# Patient Record
Sex: FEMALE | Race: WHITE | Employment: OTHER | URBAN - METROPOLITAN AREA
[De-identification: names, ages, dates, MRNs, and addresses within clinical notes are randomized per-mention and may not be internally consistent; named-entity substitution may affect disease eponyms.]

---

## 2019-01-21 ENCOUNTER — HOSPITAL ENCOUNTER (OUTPATIENT)
Dept: WOMENS IMAGING | Age: 69
Discharge: HOME | End: 2019-01-21
Payer: MEDICARE

## 2019-01-21 DIAGNOSIS — Z12.31 VISIT FOR SCREENING MAMMOGRAM: ICD-10-CM

## 2019-01-21 PROCEDURE — 77063 BREAST TOMOSYNTHESIS BI: CPT

## 2019-01-22 ENCOUNTER — CASE MANAGEMENT (OUTPATIENT)
Dept: WOMENS IMAGING | Age: 69
End: 2019-01-22

## 2019-01-28 ENCOUNTER — CASE MANAGEMENT (OUTPATIENT)
Dept: WOMENS IMAGING | Age: 69
End: 2019-01-28

## 2019-02-05 ENCOUNTER — HOSPITAL ENCOUNTER (OUTPATIENT)
Dept: PULMONOLOGY | Age: 69
Discharge: HOME | End: 2019-02-05
Payer: MEDICARE

## 2019-02-05 PROCEDURE — 94726 PLETHYSMOGRAPHY LUNG VOLUMES: CPT

## 2019-02-05 PROCEDURE — 94060 EVALUATION OF WHEEZING: CPT | Performed by: INTERNAL MEDICINE

## 2019-02-05 PROCEDURE — 94729 DIFFUSING CAPACITY: CPT | Performed by: INTERNAL MEDICINE

## 2019-02-05 PROCEDURE — 94729 DIFFUSING CAPACITY: CPT

## 2019-02-05 PROCEDURE — 6370000000 HC RX 637 (ALT 250 FOR IP): Performed by: NURSE PRACTITIONER

## 2019-02-05 PROCEDURE — 94640 AIRWAY INHALATION TREATMENT: CPT

## 2019-02-05 PROCEDURE — 94726 PLETHYSMOGRAPHY LUNG VOLUMES: CPT | Performed by: INTERNAL MEDICINE

## 2019-02-05 PROCEDURE — 94664 DEMO&/EVAL PT USE INHALER: CPT

## 2019-02-05 PROCEDURE — 94060 EVALUATION OF WHEEZING: CPT

## 2019-02-05 RX ORDER — ALBUTEROL SULFATE 90 UG/1
4 AEROSOL, METERED RESPIRATORY (INHALATION) ONCE
Status: COMPLETED | OUTPATIENT
Start: 2019-02-05 | End: 2019-02-05

## 2019-02-05 RX ADMIN — ALBUTEROL SULFATE 4 PUFF: 90 AEROSOL, METERED RESPIRATORY (INHALATION) at 08:56

## 2019-04-02 ENCOUNTER — HOSPITAL ENCOUNTER (OUTPATIENT)
Dept: WOMENS IMAGING | Age: 69
Discharge: HOME | End: 2019-04-02
Payer: MEDICARE

## 2019-04-02 ENCOUNTER — OFFICE VISIT (OUTPATIENT)
Dept: PULMONOLOGY | Age: 69
End: 2019-04-02
Payer: MEDICARE

## 2019-04-02 ENCOUNTER — HOSPITAL ENCOUNTER (OUTPATIENT)
Dept: ULTRASOUND IMAGING | Age: 69
Discharge: HOME | End: 2019-04-02
Payer: MEDICARE

## 2019-04-02 VITALS
HEIGHT: 62 IN | SYSTOLIC BLOOD PRESSURE: 132 MMHG | HEART RATE: 71 BPM | BODY MASS INDEX: 37.98 KG/M2 | OXYGEN SATURATION: 96 % | TEMPERATURE: 98.7 F | RESPIRATION RATE: 16 BRPM | WEIGHT: 206.4 LBS | DIASTOLIC BLOOD PRESSURE: 80 MMHG

## 2019-04-02 DIAGNOSIS — R92.8 ABNORMAL MAMMOGRAM: ICD-10-CM

## 2019-04-02 DIAGNOSIS — G47.30 SLEEP APNEA, UNSPECIFIED TYPE: ICD-10-CM

## 2019-04-02 DIAGNOSIS — J45.909 ASTHMA, UNSPECIFIED ASTHMA SEVERITY, UNSPECIFIED WHETHER COMPLICATED, UNSPECIFIED WHETHER PERSISTENT: ICD-10-CM

## 2019-04-02 DIAGNOSIS — J45.909 ASTHMA, UNSPECIFIED ASTHMA SEVERITY, UNSPECIFIED WHETHER COMPLICATED, UNSPECIFIED WHETHER PERSISTENT: Primary | ICD-10-CM

## 2019-04-02 LAB
BASOPHILS ABSOLUTE: 0.1 K/UL (ref 0–0.2)
BASOPHILS RELATIVE PERCENT: 0.6 %
EOSINOPHILS ABSOLUTE: 0.3 K/UL (ref 0–0.6)
EOSINOPHILS RELATIVE PERCENT: 2.9 %
HCT VFR BLD CALC: 38.2 % (ref 36–48)
HEMOGLOBIN: 12.2 G/DL (ref 12–16)
LYMPHOCYTES ABSOLUTE: 2.7 K/UL (ref 1–5.1)
LYMPHOCYTES RELATIVE PERCENT: 29.4 %
MCH RBC QN AUTO: 23.6 PG (ref 26–34)
MCHC RBC AUTO-ENTMCNC: 31.9 G/DL (ref 31–36)
MCV RBC AUTO: 74 FL (ref 80–100)
MONOCYTES ABSOLUTE: 1 K/UL (ref 0–1.3)
MONOCYTES RELATIVE PERCENT: 10.6 %
NEUTROPHILS ABSOLUTE: 5.2 K/UL (ref 1.7–7.7)
NEUTROPHILS RELATIVE PERCENT: 56.5 %
PDW BLD-RTO: 16.8 % (ref 12.4–15.4)
PLATELET # BLD: 326 K/UL (ref 135–450)
PMV BLD AUTO: 9.6 FL (ref 5–10.5)
RBC # BLD: 5.16 M/UL (ref 4–5.2)
WBC # BLD: 9.1 K/UL (ref 4–11)

## 2019-04-02 PROCEDURE — 76642 ULTRASOUND BREAST LIMITED: CPT

## 2019-04-02 PROCEDURE — 99204 OFFICE O/P NEW MOD 45 MIN: CPT | Performed by: INTERNAL MEDICINE

## 2019-04-02 PROCEDURE — G0279 TOMOSYNTHESIS, MAMMO: HCPCS

## 2019-04-02 RX ORDER — LISINOPRIL 10 MG/1
TABLET ORAL
COMMUNITY
End: 2021-07-04

## 2019-04-02 RX ORDER — FLUTICASONE PROPIONATE 50 MCG
SPRAY, SUSPENSION (ML) NASAL
COMMUNITY
Start: 2019-03-12 | End: 2021-07-04

## 2019-04-02 RX ORDER — BUDESONIDE AND FORMOTEROL FUMARATE DIHYDRATE 160; 4.5 UG/1; UG/1
2 AEROSOL RESPIRATORY (INHALATION) 2 TIMES DAILY
Qty: 1 INHALER | Refills: 6 | Status: SHIPPED | OUTPATIENT
Start: 2019-04-02 | End: 2021-07-04

## 2019-04-02 NOTE — ASSESSMENT & PLAN NOTE
-She has symptoms of daytime sleepiness with multiple naps, loud snoring, obesity, witnessed apneic episodes. concerning for obstructive sleep apnea.   -Split-night sleep study with PAP titration  -Patient advised not to drive or operate heavy machinery if feeling overly sleepy

## 2019-04-02 NOTE — PROGRESS NOTES
REASON FOR CONSULTATION:  Chief Complaint   Patient presents with    New Patient     ref by Dr. Alberto Mae for SOB         Consult at request of Preethi Mary MD     PCP: Preethi Mary MD    HISTORY OF PRESENT ILLNESS: Collin Horner is a 79y.o. year old female never smoker who presents for evaluation of subjective dyspnea. First noticed her symptoms or shortness of breath, mild cough, intermittent wheezing when she laid down at night to go to sleep. Since then she has also identified other triggers of her shortness of breath. These include strong fragrances and perfumes. She was given albuterol inhaler to be used as needed, she thinks his dizziness has improved her wheezing and shortness of breath. She is using albuterol greater than 2 times daily. Recent PFTs performed were within normal limits. Past Medical History:   Diagnosis Date    Colon cancer ([address]     High blood pressure        Past Surgical History:   Procedure Laterality Date    BREAST BIOPSY      COLON SURGERY      TUBAL LIGATION         family history includes No Known Problems in an other family member. Granddaughter and daughter with asthma    SOCIAL HISTORY:   reports that she has quit smoking. She quit after 3.00 years of use. She has never used smokeless tobacco.      ALLERGIES:  Patient has No Known Allergies. REVIEW OF SYSTEMS:  Constitutional: Negative for fever   HENT: Negative for sore throat  Eyes: Negative for redness   Respiratory:+ dyspnea, +cough  Cardiovascular: Negative for chest pain  Gastrointestinal: Negative for vomiting, diarrhea   Genitourinary: Negative for hematuria   Musculoskeletal: Negative for arthralgias   Skin: Negative for rash  Neurological: Negative for syncope  Hematological: Negative for adenopathy  Psychiatric/Behavorial: Negative for anxiety    Objective:   PHYSICAL EXAM:  Blood pressure 132/80, pulse 71, temperature 98.7 °F (37.1 °C), temperature source Oral, resp.  rate 16, height 5' 2\" (1.575 m), weight 206 lb 6.4 oz (93.6 kg), SpO2 96 %.'  Gen: No distress. Eyes: PERRL. No sclera icterus. No conjunctival injection. ENT: No discharge. Pharynx clear. External appearance of ears and nose normal.  Neck: Trachea midline. No obvious mass. Resp: No accessory muscle use. No crackles. No wheezes. No rhonchi. no Dullness to percussion. Coughing with forced expiratory maneuver. CV: Regular rate. Regular rhythm. No murmur or rub. No edema. GI: Non-tender. Non-distended. No hernia. Skin: Warm, dry, normal texture and turgor. No nodule on exposed extremities. Lymph: No cervical LAD. No supraclavicular LAD. M/S: No cyanosis. No clubbing. No joint deformity. Neuro: Moves all four extremities. Psych: Oriented x 3. No anxiety. Awake. Alert. Intact judgement and insight. Current Outpatient Medications   Medication Sig Dispense Refill    budesonide-formoterol (SYMBICORT) 160-4.5 MCG/ACT AERO Inhale 2 puffs into the lungs 2 times daily 1 Inhaler 6    albuterol sulfate (PROAIR RESPICLICK) 131 (90 Base) MCG/ACT aerosol powder inhalation Ventolin HFA 90 mcg/actuation aerosol inhaler      MULTIPLE VITAMIN PO Take by mouth      fluticasone (FLONASE) 50 MCG/ACT nasal spray       lisinopril (PRINIVIL;ZESTRIL) 10 MG tablet lisinopril 10 mg tablet   TAKE ONE TABLET BY MOUTH DAILY       No current facility-administered medications for this visit.         Data Reviewed:   CBC and Renal reviewed  Last CBC  Lab Results   Component Value Date    WBC 6.8 09/01/2017    RBC 4.49 09/01/2017    HGB 10.8 09/01/2017    MCV 74.9 09/01/2017     09/01/2017     Last Renal  Lab Results   Component Value Date     09/01/2017    K 4.4 09/01/2017    CL 99 09/01/2017    CO2 26 09/01/2017    BUN 13 09/01/2017    CREATININE 0.6 09/01/2017    GLUCOSE 98 09/01/2017    CALCIUM 9.1 09/01/2017       Last ABG  POC Blood Gas: No results found for: POCPH, POCPCO2, POCPO2, POCHCO3, NBEA, [id number]  No results for input(s): PH, PCO2, PO2, HCO3, BE, O2SAT in the last 72 hours. Radiology Review:  Pertinent images / reports were reviewed as a part of this visit. CT Chest w/ contrast: No results found for this or any previous visit. CT Chest w/o contrast: No results found for this or any previous visit. CTPA: No results found for this or any previous visit. CXR PA/LAT: No results found for this or any previous visit. CXR portable: No results found for this or any previous visit. Assessment/Plan:       Diagnosis Orders   1. Asthma, unspecified asthma severity, unspecified whether complicated, unspecified whether persistent  Respiratory allergen profile    CBC Auto Differential   2. Sleep apnea, unspecified type  Sleep Study with PAP Titration         Problem List Items Addressed This Visit        Pulmonary Problems    Asthma - Primary     -Her symptoms of shortness of breath/cough at night, common triggers, improvement with rescue inhaler point toward clinical diagnosis of onset asthma. This is supported by strong family history of asthma. -PFTs within normal limits  -Symbicort twice daily, continue rescue inhaler as needed  -Respiratory allergen panel, CBC with differential         Relevant Medications    albuterol sulfate (PROAIR RESPICLICK) 381 (90 Base) MCG/ACT aerosol powder inhalation    budesonide-formoterol (SYMBICORT) 160-4.5 MCG/ACT AERO    Other Relevant Orders    Respiratory allergen profile    CBC Auto Differential    Sleep apnea     -She has symptoms of daytime sleepiness with multiple naps, loud snoring, obesity, witnessed apneic episodes. concerning for obstructive sleep apnea. -Split-night sleep study with PAP titration  -Patient advised not to drive or operate heavy machinery if feeling overly sleepy         Relevant Orders    Sleep Study with PAP Titration        Return to Clinic in 3 months     This note was transcribed using [id number] [employer].  Please disregard any translational errors.     Anthony Gomez [address] [place] [address] Pulmonary, Sleep and Critical Care

## 2019-04-05 LAB
ALLERGEN ASPERGILLUS ALTERNATA IGE: 1.93 KU/L
ALLERGEN ASPERGILLUS FUMIGATUS IGE: 0.16 KU/L
ALLERGEN BERMUDA GRASS IGE: <0.1 KU/L
ALLERGEN BIRCH IGE: <0.1 KU/L
ALLERGEN CAT DANDER IGE: 8.04 KU/L
ALLERGEN COMMON SHORT RAGWEED IGE: 0.42 KU/L
ALLERGEN COTTONWOOD: <0.1 KU/L
ALLERGEN COW MILK IGE: <0.1 KU/L
ALLERGEN DOG DANDER IGE: 0.69 KU/L
ALLERGEN ELM IGE: <0.1 KU/L
ALLERGEN FUNGI/MOLD M.RACEMOSUS IGE: <0.1 KU/L
ALLERGEN GERMAN COCKROACH IGE: 0.1 KU/L
ALLERGEN HORMODENDRUM HORDEI IGE: <0.1 KU/L
ALLERGEN MAPLE/BOX ELDER IGE: 0.14 KU/L
ALLERGEN MITE DUST FARINAE IGE: 0.1 KU/L
ALLERGEN MITE DUST PTERONYSSINUS IGE: <0.1 KU/L
ALLERGEN MOUNTAIN CEDAR: <0.1 KU/L
ALLERGEN MOUSE EPITHELIA IGE: <0.1 KU/L
ALLERGEN OAK TREE IGE: <0.1 KU/L
ALLERGEN PEANUT (F13) IGE: <0.1 KU/L
ALLERGEN PECAN TREE IGE: <0.1 KU/L
ALLERGEN PENICILLIUM NOTATUM: 0.18 KU/L
ALLERGEN ROUGH PIGWEED (W14) IGE: <0.1 KU/L
ALLERGEN RUSSIAN THISTLE IGE: <0.1 KU/L
ALLERGEN SEE NOTE: NORMAL
ALLERGEN SHEEP SORREL (W18) IGE: <0.1 KU/L
ALLERGEN TIMOTHY GRASS: <0.1 KU/L
ALLERGEN TREE SYCAMORE: <0.1 KU/L
ALLERGEN WALNUT TREE IGE: <0.1 KU/L
ALLERGEN WHITE MULBERRY TREE, IGE: <0.1 KU/L
ALLERGEN, TREE, WHITE ASH IGE: <0.1 KU/L
IGE: 113 KU/L

## 2019-05-03 ENCOUNTER — HOSPITAL ENCOUNTER (OUTPATIENT)
Dept: SLEEP CENTER | Age: 69
Discharge: HOME | End: 2019-05-03
Payer: MEDICARE

## 2019-05-03 DIAGNOSIS — G47.30 SLEEP APNEA, UNSPECIFIED TYPE: ICD-10-CM

## 2019-05-03 PROCEDURE — 95810 POLYSOM 6/> YRS 4/> PARAM: CPT | Performed by: PSYCHIATRY & NEUROLOGY

## 2019-05-03 PROCEDURE — 95810 POLYSOM 6/> YRS 4/> PARAM: CPT

## 2019-05-06 ENCOUNTER — TELEPHONE (OUTPATIENT)
Dept: SLEEP CENTER | Age: 69
End: 2019-05-06

## 2019-05-06 DIAGNOSIS — G47.30 SLEEP APNEA, UNSPECIFIED TYPE: Primary | ICD-10-CM

## 2019-05-06 NOTE — LETTER
Dear Aracelis Padilla,    We have made many attempts to reach you at the numbers we have listed. Please call the office at [phone] for the results of your sleep studies, If you have already received the results you may disregard this letter.     Thank You,        Dr. Newman Comp

## 2019-05-07 DIAGNOSIS — G47.30 SLEEP APNEA, UNSPECIFIED TYPE: ICD-10-CM

## 2019-05-07 NOTE — TELEPHONE ENCOUNTER
Sleep study showed mild YOSELYN. AHI was 10.1  per hr. And O2 Desaturations to 83%. Dr. Sharyle Buckler please place a titration study if you would like for the patient to go for a titration or did you want auto pap?

## 2019-05-13 NOTE — TELEPHONE ENCOUNTER
Dr. Tyrell Garcia the message was closed out. Please advise did you want to send an order over for apap?

## 2019-05-14 NOTE — TELEPHONE ENCOUNTER
Tried to call the patient her phone doesn't have a vm and said that she is not available at this time.

## 2019-05-22 ENCOUNTER — TELEPHONE (OUTPATIENT)
Dept: PULMONOLOGY | Age: 69
End: 2019-05-22

## 2019-05-22 NOTE — TELEPHONE ENCOUNTER
Dr. Maurice Shin the patient didn't meet split criteria , what apap setting should I send to her DME?

## 2019-05-23 NOTE — TELEPHONE ENCOUNTER
I left a message for the patient that we are sending her cpap order to [facility] & left the [facility] phone number for her.

## 2019-06-10 ENCOUNTER — TELEPHONE (OUTPATIENT)
Dept: PULMONOLOGY | Age: 69
End: 2019-06-10

## 2019-06-10 NOTE — TELEPHONE ENCOUNTER
Phone call to patient, order still sitting here awaiting patient's authorization to send to [facility].  left message to call

## 2019-08-09 ENCOUNTER — OFFICE VISIT (OUTPATIENT)
Dept: PULMONOLOGY | Age: 69
End: 2019-08-09
Payer: MEDICARE

## 2019-08-09 VITALS
BODY MASS INDEX: 35.26 KG/M2 | WEIGHT: 199 LBS | RESPIRATION RATE: 16 BRPM | DIASTOLIC BLOOD PRESSURE: 73 MMHG | HEIGHT: 63 IN | TEMPERATURE: 98.6 F | HEART RATE: 63 BPM | SYSTOLIC BLOOD PRESSURE: 129 MMHG | OXYGEN SATURATION: 97 %

## 2019-08-09 DIAGNOSIS — R05.3 CHRONIC COUGH: ICD-10-CM

## 2019-08-09 DIAGNOSIS — G47.33 OBSTRUCTIVE SLEEP APNEA SYNDROME: ICD-10-CM

## 2019-08-09 DIAGNOSIS — J45.40 MODERATE PERSISTENT ASTHMA WITHOUT COMPLICATION: Primary | ICD-10-CM

## 2019-08-09 PROCEDURE — 99213 OFFICE O/P EST LOW 20 MIN: CPT | Performed by: INTERNAL MEDICINE

## 2019-08-09 ASSESSMENT — SLEEP AND FATIGUE QUESTIONNAIRES
HOW LIKELY ARE YOU TO NOD OFF OR FALL ASLEEP WHILE SITTING INACTIVE IN A PUBLIC PLACE: 0
HOW LIKELY ARE YOU TO NOD OFF OR FALL ASLEEP WHEN YOU ARE A PASSENGER IN A CAR FOR AN HOUR WITHOUT A BREAK: 0
HOW LIKELY ARE YOU TO NOD OFF OR FALL ASLEEP WHILE SITTING QUIETLY AFTER LUNCH WITHOUT ALCOHOL: 0
HOW LIKELY ARE YOU TO NOD OFF OR FALL ASLEEP WHILE SITTING AND TALKING TO SOMEONE: 0
HOW LIKELY ARE YOU TO NOD OFF OR FALL ASLEEP WHILE SITTING AND READING: 1
HOW LIKELY ARE YOU TO NOD OFF OR FALL ASLEEP IN A CAR, WHILE STOPPED FOR A FEW MINUTES IN TRAFFIC: 0
ESS TOTAL SCORE: 3
HOW LIKELY ARE YOU TO NOD OFF OR FALL ASLEEP WHILE LYING DOWN TO REST IN THE AFTERNOON WHEN CIRCUMSTANCES PERMIT: 0
HOW LIKELY ARE YOU TO NOD OFF OR FALL ASLEEP WHILE WATCHING TV: 2

## 2019-08-09 NOTE — ASSESSMENT & PLAN NOTE
-Could be secondary to her lisinopril, would recommend a trial off with alternative antihypertensive to see if cough improves.  -Also very likely that this is a cough variant asthma, given her very poor adherence to inhalers and poor technique when she does use them I have no confidence that her asthma is been appropriately treated. We will continue to monitor her symptoms of cough as inhaler regimen/adherence improves, and switched off lisinopril.

## 2019-08-09 NOTE — PROGRESS NOTES
spaRJEANAON FOR CONSULTATION:  Chief Complaint   Patient presents with    Sleep Apnea        Consult at request of Jaylan Cali MD     PCP: Jaylan Cali MD    HISTORY OF PRESENT ILLNESS: Maribel Henry is a 81y.o. year old female never smoker who presents for follow up of moderate persistent asthma and YOSELYN. Frequency of albuterol prn: Once per day  Frequency of nocturnal symptoms: Once per week  Current Regimen:    Inhalers: Symbicort (nonadherent, taking once per day, poor technique) Pro Air as needed   Orals: None   Abx: None   Chronic systemic Steroid none  Wheeze none  Cough constant, dry    Triggers include: Strong odor/fragrances  Respiratory allergen profile-IgE 113; high cat titers  PFTs performed were within normal limits. No flowsheet data found. Obstructive sleep apnea  Since last appointment had a PSG performed that showed an AHI of 10.1, O2 maddison of 83%. She was prescribed AutoPap 5 to 15 cm H2O. She is poorly compliant out of the last 90 days she has only worn one day greater than 4 hours. Her compliance rate of 1%.   She states that the nasal pillows irritate her nose, she is constantly taking them at home and in readjust.  He has not yet contacted her DME for different interface      Sleep Medicine 8/9/2019   Sitting and reading 1   Watching TV 2   Sitting, inactive in a public place (e.g. a theatre or a meeting) 0   As a passenger in a car for an hour without a break 0   Lying down to rest in the afternoon when circumstances permit 0   Sitting and talking to someone 0   Sitting quietly after a lunch without alcohol 0   In a car, while stopped for a few minutes in traffic 0   Total score 3             Past Medical History:   Diagnosis Date    [facility] [address] ([facility] [address]     High blood pressure        Past Surgical History:   Procedure Laterality Date    BREAST BIOPSY      COLON SURGERY      TUBAL LIGATION         family history includes No Known Problems in an other family

## 2020-03-06 ENCOUNTER — HOSPITAL ENCOUNTER (OUTPATIENT)
Dept: WOMENS IMAGING | Age: 70
Discharge: HOME | End: 2020-03-06
Payer: COMMERCIAL

## 2020-03-06 PROCEDURE — G0279 TOMOSYNTHESIS, MAMMO: HCPCS

## 2020-06-11 ENCOUNTER — HOSPITAL ENCOUNTER (EMERGENCY)
Age: 70
Discharge: HOME | End: 2020-06-11
Payer: COMMERCIAL

## 2020-06-11 ENCOUNTER — APPOINTMENT (OUTPATIENT)
Dept: GENERAL RADIOLOGY | Age: 70
End: 2020-06-11
Payer: COMMERCIAL

## 2020-06-11 VITALS
DIASTOLIC BLOOD PRESSURE: 83 MMHG | HEIGHT: 60 IN | BODY MASS INDEX: 39.21 KG/M2 | SYSTOLIC BLOOD PRESSURE: 131 MMHG | RESPIRATION RATE: 18 BRPM | WEIGHT: 199.74 LBS | HEART RATE: 82 BPM | TEMPERATURE: 98.4 F | OXYGEN SATURATION: 98 %

## 2020-06-11 LAB
A/G RATIO: 1.4 (ref 1.1–2.2)
ALBUMIN SERPL-MCNC: 4.8 G/DL (ref 3.4–5)
ALP BLD-CCNC: 94 U/L (ref 40–129)
ALT SERPL-CCNC: 16 U/L (ref 10–40)
ANION GAP SERPL CALCULATED.3IONS-SCNC: 12 MMOL/L (ref 3–16)
AST SERPL-CCNC: 22 U/L (ref 15–37)
BASOPHILS ABSOLUTE: 0.1 K/UL (ref 0–0.2)
BASOPHILS RELATIVE PERCENT: 0.5 %
BILIRUB SERPL-MCNC: 0.3 MG/DL (ref 0–1)
BUN BLDV-MCNC: 25 MG/DL (ref 7–20)
CALCIUM SERPL-MCNC: 9.9 MG/DL (ref 8.3–10.6)
CHLORIDE BLD-SCNC: 92 MMOL/L (ref 99–110)
CO2: 26 MMOL/L (ref 21–32)
CREAT SERPL-MCNC: 0.8 MG/DL (ref 0.6–1.2)
EOSINOPHILS ABSOLUTE: 0.2 K/UL (ref 0–0.6)
EOSINOPHILS RELATIVE PERCENT: 1.6 %
GFR AFRICAN AMERICAN: >60
GFR NON-AFRICAN AMERICAN: >60
GLOBULIN: 3.5 G/DL
GLUCOSE BLD-MCNC: 118 MG/DL (ref 70–99)
HCT VFR BLD CALC: 38.9 % (ref 36–48)
HEMOGLOBIN: 12.8 G/DL (ref 12–16)
LYMPHOCYTES ABSOLUTE: 2.5 K/UL (ref 1–5.1)
LYMPHOCYTES RELATIVE PERCENT: 20.7 %
MCH RBC QN AUTO: 23.3 PG (ref 26–34)
MCHC RBC AUTO-ENTMCNC: 32.8 G/DL (ref 31–36)
MCV RBC AUTO: 71.1 FL (ref 80–100)
MONOCYTES ABSOLUTE: 0.9 K/UL (ref 0–1.3)
MONOCYTES RELATIVE PERCENT: 7.6 %
NEUTROPHILS ABSOLUTE: 8.4 K/UL (ref 1.7–7.7)
NEUTROPHILS RELATIVE PERCENT: 69.6 %
PDW BLD-RTO: 17.7 % (ref 12.4–15.4)
PLATELET # BLD: 351 K/UL (ref 135–450)
PMV BLD AUTO: 9.3 FL (ref 5–10.5)
POTASSIUM REFLEX MAGNESIUM: 3.8 MMOL/L (ref 3.5–5.1)
PRO-BNP: 88 PG/ML (ref 0–124)
RBC # BLD: 5.47 M/UL (ref 4–5.2)
SODIUM BLD-SCNC: 130 MMOL/L (ref 136–145)
TOTAL PROTEIN: 8.3 G/DL (ref 6.4–8.2)
TROPONIN: <0.01 NG/ML
WBC # BLD: 12 K/UL (ref 4–11)

## 2020-06-11 PROCEDURE — 93005 ELECTROCARDIOGRAM TRACING: CPT | Performed by: NURSE PRACTITIONER

## 2020-06-11 PROCEDURE — 99283 EMERGENCY DEPT VISIT LOW MDM: CPT

## 2020-06-11 PROCEDURE — 83880 ASSAY OF NATRIURETIC PEPTIDE: CPT

## 2020-06-11 PROCEDURE — 80053 COMPREHEN METABOLIC PANEL: CPT

## 2020-06-11 PROCEDURE — 85025 COMPLETE CBC W/AUTO DIFF WBC: CPT

## 2020-06-11 PROCEDURE — 71045 X-RAY EXAM CHEST 1 VIEW: CPT

## 2020-06-11 PROCEDURE — 84484 ASSAY OF TROPONIN QUANT: CPT

## 2020-06-11 ASSESSMENT — PAIN DESCRIPTION - PAIN TYPE: TYPE: ACUTE PAIN

## 2020-06-11 NOTE — ED TRIAGE NOTES
Pt arrived to the ED via private car, pt states that she has a blood pressure cuff at home and it was reading high, pt stated that he PCP told her to come to the ED, pt is alert and orient, vss afebrile

## 2020-06-12 ENCOUNTER — CARE COORDINATION (OUTPATIENT)
Dept: CARE COORDINATION | Age: 70
End: 2020-06-12

## 2020-06-12 LAB
EKG ATRIAL RATE: 81 BPM
EKG DIAGNOSIS: NORMAL
EKG P AXIS: 59 DEGREES
EKG P-R INTERVAL: 184 MS
EKG Q-T INTERVAL: 392 MS
EKG QRS DURATION: 84 MS
EKG QTC CALCULATION (BAZETT): 455 MS
EKG R AXIS: 6 DEGREES
EKG T AXIS: 36 DEGREES
EKG VENTRICULAR RATE: 81 BPM

## 2020-06-12 PROCEDURE — 93010 ELECTROCARDIOGRAM REPORT: CPT | Performed by: INTERNAL MEDICINE

## 2020-06-12 NOTE — ED PROVIDER NOTES
[address]  [address] [address]  Dept: [phone]  Loc: [address] ENCOUNTER        This patient was not seen or evaluated by the attending physician. I evaluated this patient, the attending physician was available for consultation. CHIEF COMPLAINT    Chief Complaint   Patient presents with    Hypertension     sent by pcp for htn.  pt states bp at home 197/108       HPI    Jessy Martin is a 81 y.o. female who presents with a complaint that her blood pressure is elevated. The level of the blood pressure noted was 197/108. This was noted just prior to arrival. The context is that she was just checking her blood pressure which she does on a routine and denied any symptoms. The patient has no localized pain. There are no aggravating or alleviating factors. REVIEW OF SYSTEMS    Neurologic: No headache or dizziness or blurry vision  Cardiac: No chest pain or palpitations  Respiratory: No shortness of breath   General: No fevers  : No hematuria  GI: No nausea or vomiting or abdominal pain  See HPI for further details. All other systems reviewed and are negative.     PAST MEDICAL OR SURGICAL HISTORY    Past Medical History:   Diagnosis Date    Colon cancer ([facility] [address]     High blood pressure      Past Surgical History:   Procedure Laterality Date    BREAST BIOPSY      COLON SURGERY      TUBAL LIGATION         CURRENT MEDICATIONS    Current Outpatient Rx   Medication Sig Dispense Refill    Spacer/Aero Chamber Mouthpiece MISC 1 each by Does not apply route 2 times daily 1 each 0    Spacer/Aero Chamber Mouthpiece MISC 1 each by Does not apply route once as needed (with inhaler) 1 each 0    albuterol sulfate (PROAIR RESPICLICK) 798 (90 Base) MCG/ACT aerosol powder inhalation Ventolin HFA 90 mcg/actuation aerosol inhaler      MULTIPLE VITAMIN PO Take by mouth      fluticasone (FLONASE) 50 acute distress  Eyes:  Pupils equally round and reactive to light, sclera nonicteric  HENT:  Atraumatic  NECK: Normal range of motion, no JVD  Respiratory: Lungs clear to auscultation bilaterally, no wheezing, no respiratory distress  Cardiovascular:  regular rate, normal rhythm, no murmurs   GI:  Soft, nondistended, normal bowel sounds, nontender, no pulsatile masses  Musculoskeletal:  No edema, no acute deformities   Integument:  Skin is warm and dry, no obvious rash    Vascular: Radial pulses 2+ equal bilaterally  Neurologic:  Awake, alert, oriented, no aphasia, no slurred speech, CN II-XII intact, normal finger to nose test bilaterally, 5/5 strength in all 4 extremities, sensation to light touch intact bilaterally, patellar reflexes 2+ and equal bilaterally    EKG   Please see the ED Physician note for EKG interpretation. RADIOLOGY/PROCEDURES    XR CHEST PORTABLE   Final Result   No radiographic evidence of acute pulmonary disease. ED COURSE & MEDICAL DECISION MAKING    Pertinent studies reviewed and interpreted. (See chart for details)  See chart for details of medications prescribed. Vitals:    06/11/20 2030 06/11/20 2045 06/11/20 2100 06/11/20 2115   BP: (!) 144/79   131/83   Pulse: 75 77 83 82   Resp: 17 17 24 18   Temp:    98.4 °F (36.9 °C)   TempSrc:    Oral   SpO2: 99% 98% 97% 98%   Weight:       Height:           Differential diagnosis: Asymptomatic hypertension, hypertensive urgency, hypertensive emergency, hypertension encephalopathy, acute coronary syndrome, acute renal failure, Thrombotic Stroke, Embolic Stroke, Hemorrhagic Stroke, pain or vertigo or other medical problems elevating the blood pressure secondarily which is a normal physiologic response, other    Patient seen and examined today for HTN. See HPI for patient presentation. Patient is hemodynamically stable, nontoxic, afebrile, and without tachycardia, tachypnea, and hypoxia. Physical exam as above.   81year-old lying inevitably there remain words that are mis-transcribed.)          Haroldo Castillo, APRN - CNP  06/11/20 1425

## 2020-06-12 NOTE — CARE COORDINATION
Patient contacted regarding recent discharge and COVID-19 risk. Discussed COVID-19 related testing which was not done at this time. Test results were not done. Patient informed of results, if available? n/a     Care Transition Nurse/ Ambulatory Care Manager contacted the patient by telephone to perform post discharge assessment. Verified name and  with patient as identifiers. Patient has following risk factors of: asthma. CTN/ACM reviewed discharge instructions, medical action plan and red flags related to discharge diagnosis. Reviewed and educated them on any new and changed medications related to discharge diagnosis. Advised obtaining a 90-day supply of all daily and as-needed medications. Education provided regarding infection prevention, and signs and symptoms of COVID-19 and when to seek medical attention with patient who verbalized understanding. Discussed exposure protocols and quarantine from [address] you at higher risk for severe illness  and given an opportunity for questions and concerns. The patient agrees to contact the COVID-19 hotline [phone] or PCP office for questions related to their healthcare. CTN/ACM provided contact information for future reference. From CDC: Are you at higher risk for severe illness?  Wash your hands often.  Avoid close contact (6 feet, which is about two arm lengths) with people who are sick.  Put distance between yourself and other people if COVID-19 is spreading in your community.  Clean and disinfect frequently touched surfaces.  Avoid all cruise travel and non-essential air travel.  Call your healthcare professional if you have concerns about COVID-19 and your underlying condition or if you are sick. For more information on steps you can take to protect yourself, see CDC's How to [address] [place] [address] for follow-up call in 7-14 days based on severity of symptoms and risk factors.

## 2021-07-04 ENCOUNTER — APPOINTMENT (OUTPATIENT)
Dept: CT IMAGING | Age: 71
End: 2021-07-04
Payer: MEDICARE

## 2021-07-04 ENCOUNTER — APPOINTMENT (OUTPATIENT)
Dept: GENERAL RADIOLOGY | Age: 71
End: 2021-07-04
Payer: MEDICARE

## 2021-07-04 ENCOUNTER — HOSPITAL ENCOUNTER (EMERGENCY)
Age: 71
Discharge: HOME | End: 2021-07-04
Attending: STUDENT IN AN ORGANIZED HEALTH CARE EDUCATION/TRAINING PROGRAM
Payer: MEDICARE

## 2021-07-04 VITALS
HEART RATE: 68 BPM | RESPIRATION RATE: 17 BRPM | HEIGHT: 63 IN | SYSTOLIC BLOOD PRESSURE: 133 MMHG | DIASTOLIC BLOOD PRESSURE: 61 MMHG | WEIGHT: 198.41 LBS | BODY MASS INDEX: 35.16 KG/M2 | OXYGEN SATURATION: 96 % | TEMPERATURE: 98.1 F

## 2021-07-04 DIAGNOSIS — G93.89 MASS OF BRAIN: ICD-10-CM

## 2021-07-04 DIAGNOSIS — G93.6 VASOGENIC BRAIN EDEMA (HCC): Primary | ICD-10-CM

## 2021-07-04 LAB
ANION GAP SERPL CALCULATED.3IONS-SCNC: 13 MMOL/L (ref 3–16)
BACTERIA: ABNORMAL /HPF
BASOPHILS ABSOLUTE: 0.1 K/UL (ref 0–0.2)
BASOPHILS RELATIVE PERCENT: 0.6 %
BILIRUBIN URINE: NEGATIVE
BLOOD, URINE: NEGATIVE
BUN BLDV-MCNC: 18 MG/DL (ref 7–20)
CALCIUM SERPL-MCNC: 9.3 MG/DL (ref 8.3–10.6)
CHLORIDE BLD-SCNC: 102 MMOL/L (ref 99–110)
CLARITY: CLEAR
CO2: 22 MMOL/L (ref 21–32)
COLOR: YELLOW
CREAT SERPL-MCNC: 1 MG/DL (ref 0.6–1.2)
EOSINOPHILS ABSOLUTE: 0.3 K/UL (ref 0–0.6)
EOSINOPHILS RELATIVE PERCENT: 3 %
EPITHELIAL CELLS, UA: 2 /HPF (ref 0–5)
GFR AFRICAN AMERICAN: >60
GFR NON-AFRICAN AMERICAN: 55
GLUCOSE BLD-MCNC: 100 MG/DL (ref 70–99)
GLUCOSE BLD-MCNC: 99 MG/DL (ref 70–99)
GLUCOSE URINE: NEGATIVE MG/DL
HCT VFR BLD CALC: 36 % (ref 36–48)
HEMOGLOBIN: 12 G/DL (ref 12–16)
HYALINE CASTS: 0 /LPF (ref 0–8)
INR BLD: 1.05 (ref 0.88–1.12)
KETONES, URINE: NEGATIVE MG/DL
LEUKOCYTE ESTERASE, URINE: ABNORMAL
LYMPHOCYTES ABSOLUTE: 2.7 K/UL (ref 1–5.1)
LYMPHOCYTES RELATIVE PERCENT: 28 %
MCH RBC QN AUTO: 27 PG (ref 26–34)
MCHC RBC AUTO-ENTMCNC: 33.4 G/DL (ref 31–36)
MCV RBC AUTO: 80.8 FL (ref 80–100)
MICROSCOPIC EXAMINATION: YES
MONOCYTES ABSOLUTE: 0.8 K/UL (ref 0–1.3)
MONOCYTES RELATIVE PERCENT: 7.9 %
NEUTROPHILS ABSOLUTE: 5.8 K/UL (ref 1.7–7.7)
NEUTROPHILS RELATIVE PERCENT: 60.5 %
NITRITE, URINE: NEGATIVE
PDW BLD-RTO: 14.7 % (ref 12.4–15.4)
PERFORMED ON: NORMAL
PH UA: 6 (ref 5–8)
PLATELET # BLD: 285 K/UL (ref 135–450)
PMV BLD AUTO: 9.3 FL (ref 5–10.5)
POTASSIUM REFLEX MAGNESIUM: 4.1 MMOL/L (ref 3.5–5.1)
PROTEIN UA: NEGATIVE MG/DL
PROTHROMBIN TIME: 11.9 SEC (ref 9.9–12.7)
RBC # BLD: 4.45 M/UL (ref 4–5.2)
RBC UA: 1 /HPF (ref 0–4)
SODIUM BLD-SCNC: 137 MMOL/L (ref 136–145)
SPECIFIC GRAVITY UA: >1.03 (ref 1–1.03)
TROPONIN: <0.01 NG/ML
URINE TYPE: ABNORMAL
UROBILINOGEN, URINE: 0.2 E.U./DL
WBC # BLD: 9.6 K/UL (ref 4–11)
WBC UA: 9 /HPF (ref 0–5)

## 2021-07-04 PROCEDURE — 85610 PROTHROMBIN TIME: CPT

## 2021-07-04 PROCEDURE — 6360000002 HC RX W HCPCS: Performed by: STUDENT IN AN ORGANIZED HEALTH CARE EDUCATION/TRAINING PROGRAM

## 2021-07-04 PROCEDURE — 84484 ASSAY OF TROPONIN QUANT: CPT

## 2021-07-04 PROCEDURE — 71045 X-RAY EXAM CHEST 1 VIEW: CPT

## 2021-07-04 PROCEDURE — 93005 ELECTROCARDIOGRAM TRACING: CPT | Performed by: STUDENT IN AN ORGANIZED HEALTH CARE EDUCATION/TRAINING PROGRAM

## 2021-07-04 PROCEDURE — 96374 THER/PROPH/DIAG INJ IV PUSH: CPT

## 2021-07-04 PROCEDURE — 99284 EMERGENCY DEPT VISIT MOD MDM: CPT

## 2021-07-04 PROCEDURE — 70450 CT HEAD/BRAIN W/O DYE: CPT

## 2021-07-04 PROCEDURE — 70496 CT ANGIOGRAPHY HEAD: CPT

## 2021-07-04 PROCEDURE — 73030 X-RAY EXAM OF SHOULDER: CPT

## 2021-07-04 PROCEDURE — 81001 URINALYSIS AUTO W/SCOPE: CPT

## 2021-07-04 PROCEDURE — 36415 COLL VENOUS BLD VENIPUNCTURE: CPT

## 2021-07-04 PROCEDURE — 85025 COMPLETE CBC W/AUTO DIFF WBC: CPT

## 2021-07-04 PROCEDURE — 6360000004 HC RX CONTRAST MEDICATION: Performed by: STUDENT IN AN ORGANIZED HEALTH CARE EDUCATION/TRAINING PROGRAM

## 2021-07-04 PROCEDURE — 80048 BASIC METABOLIC PNL TOTAL CA: CPT

## 2021-07-04 RX ORDER — DEXAMETHASONE SODIUM PHOSPHATE 10 MG/ML
8 INJECTION, SOLUTION INTRAMUSCULAR; INTRAVENOUS ONCE
Status: COMPLETED | OUTPATIENT
Start: 2021-07-04 | End: 2021-07-04

## 2021-07-04 RX ORDER — LOSARTAN POTASSIUM 100 MG/1
100 TABLET ORAL DAILY
COMMUNITY

## 2021-07-04 RX ORDER — LEVOTHYROXINE SODIUM 0.03 MG/1
25 TABLET ORAL DAILY
COMMUNITY

## 2021-07-04 RX ORDER — PREDNISONE 50 MG/1
50 TABLET ORAL DAILY
Qty: 5 TABLET | Refills: 0 | Status: ON HOLD | OUTPATIENT
Start: 2021-07-04 | End: 2021-07-10 | Stop reason: HOSPADM

## 2021-07-04 RX ORDER — AMLODIPINE BESYLATE 10 MG/1
10 TABLET ORAL DAILY
COMMUNITY

## 2021-07-04 RX ADMIN — IOPAMIDOL 75 ML: 755 INJECTION, SOLUTION INTRAVENOUS at 17:07

## 2021-07-04 RX ADMIN — DEXAMETHASONE SODIUM PHOSPHATE 8 MG: 10 INJECTION, SOLUTION INTRAMUSCULAR; INTRAVENOUS at 18:39

## 2021-07-04 ASSESSMENT — PAIN SCALES - GENERAL
PAINLEVEL_OUTOF10: 0
PAINLEVEL_OUTOF10: 0

## 2021-07-04 NOTE — ED NOTES
Report given to Herrera Agent RN to assume care. All questions answered.       Joby Painter RN  07/04/21 1934

## 2021-07-04 NOTE — ED NOTES
Resolution of symptoms at this time. Pt reports return of sensation on her right side and is completely able to move upper and lower extremities on the right. Dr. Pattie Kennedy notified. No new orders, will continue to monitor.       Bouchra Lee RN  07/04/21 3 Bettye Tierney RN  07/04/21 5739

## 2021-07-04 NOTE — ED NOTES
Pt provided with ice water with Dr. Gardner Los permission to help her urinate.       [address] [facility] Alexandr, RN  07/04/21 1985

## 2021-07-04 NOTE — PLAN OF CARE
79 yo female with hx of right sided apraxia and numbness for several weeks. PMH  30 lb weight loss. 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 A/P Weakness likely due to metastic lesion left parietal lobe. Needs MRI w/ and w/o contrast. Also need oncology evaluation. Can be done inpatient at [facility], transferred to [facility] for workup, or as outpatient with follow up to [facility] this week. ED MD to discuss with family.

## 2021-07-04 NOTE — PROGRESS NOTES
Medication Reconciliation     List of medications patient is currently taking is complete. Source of information:   1. Conversation with patient and daughter at bedside  2. EPIC records        Notes regarding home medications:  1. Patient received all of her home medications today PTA. 2. Patient stated that she had a chronic cough when taking Lisinopril which is why she was using inhalers. Once Lisinopril was dc'd, cough went away and she no longer uses any inhalers. Denies any other OTC/herbal medications.      Jian Fletcher, Pharmacy Intern

## 2021-07-04 NOTE — ED TRIAGE NOTES
Pt arrived to dept via private vehicle with her daughter. Pt c/o right arm numbness and weakness since yesterday. Pt reports falling today after she \"blacked out a little bit\". Denies hitting her head or pain at this time. Pt awake, alert and oriented to person, place and situation with some disorientation to the date. Pt's daughter states that she has memory problems at baseline. Skin warm and dry/normal color for ethnicity. Resp easy and unlabored. Pt placed in gown and on cardiac monitor. Call light in reach. Will continue to monitor.

## 2021-07-04 NOTE — ED NOTES
Pt's daughter states that the pt is borderline MRDD, that she can't read and she has a slight speech impediment at baseline. Daughter states that the pt speech does not sound slurred.      Evens Mohamud RN  07/04/21 0771

## 2021-07-04 NOTE — ED NOTES
Pt adjusted in bed for comfort and was placed on a purewick and instructed to urinate.       [address] [facility] Alexandr, RN  07/04/21 7338

## 2021-07-04 NOTE — ED NOTES

## 2021-07-05 LAB
EKG ATRIAL RATE: 78 BPM
EKG DIAGNOSIS: NORMAL
EKG P AXIS: 16 DEGREES
EKG P-R INTERVAL: 174 MS
EKG Q-T INTERVAL: 402 MS
EKG QRS DURATION: 80 MS
EKG QTC CALCULATION (BAZETT): 458 MS
EKG R AXIS: -4 DEGREES
EKG T AXIS: 21 DEGREES
EKG VENTRICULAR RATE: 78 BPM

## 2021-07-05 PROCEDURE — 93010 ELECTROCARDIOGRAM REPORT: CPT | Performed by: INTERNAL MEDICINE

## 2021-07-05 NOTE — ED PROVIDER NOTES
[address] [place]      Pt Name: Lary Hutson  MRN: [id number]  [place] 1950  Date of evaluation: 7/4/2021  Provider: Rajesh Mendez MD    CHIEF COMPLAINT       Chief Complaint   Patient presents with    Extremity Weakness     Pt c/o right arm and hand numbness and weakness since yesterday. Pt fell today after she \"blacked out a little bit\". Denies hitting head.  Fall         HISTORY OF PRESENT ILLNESS   (Location/Symptom, Timing/Onset,Context/Setting, Quality, Duration, Modifying Factors, Severity)  Note limiting factors. Lary Hutson is a 78 y.o. female who presents to the emergency department complaining of right hand and right leg numbness for at least the past 2 days, associated with right hand clumsiness for the past week, had an episode of falling today after she bent over and became lightheaded. Described as moderate. Does not think that she had complete loss of consciousness. Denies chest pain, palpitations, shortness of breath, fevers, chills, headache. Describes hand clumsiness as difficulty grasping items for the past week, associated right hand paresthesias and decreased sensation in addition to right leg paresthesias and decreased leg sensation. Symptoms not otherwise alleviated or exacerbated by other factors. NursingNotes were reviewed. REVIEW OF SYSTEMS    (2-9 systems for level 4, 10 or more for level 5)       Constitutional: No fever or chills. Eye: No visual disturbances. No eye pain. Ear/Nose/Mouth/Throat: No nasal congestion. No sore throat. Respiratory: No cough, No shortness of breath, No sputum production. Cardiovascular: No chest pain. No palpitations. Gastrointestinal: No abdominal pain. No nausea or vomiting  Genitourinary: No dysuria. No hematuria. Hematology/Lymphatics: No bleeding or bruising tendency. Immunologic: No malaise. No swollen glands. Musculoskeletal: No back pain. Transportation (Medical):  Lack of Transportation (Non-Medical):    Physical Activity:     Days of Exercise per Week:     Minutes of Exercise per Session:    Stress:     Feeling of Stress :    Social Connections:     Frequency of Communication with Friends and Family:     Frequency of Social Gatherings with Friends and Family:     Attends [employer] Services:     Active Member of Clubs or Organizations:     Attends Club or Organization Meetings:     Marital Status:    Intimate Partner Violence:     Fear of Current or Ex-Partner:     Emotionally Abused:     Physically Abused:     Sexually Abused:        SCREENINGS   NIH Stroke Scale  Interval: Reassessment  Level of Consciousness (1a. ): Alert  LOC Questions (1b. ): Answers both correctly  LOC Commands (1c. ): Performs both tasks correctly  Best Gaze (2. ): Normal  Visual (3. ): No visual loss  Facial Palsy (4. ): Normal symmetrical movement  Motor Arm, Left (5a. ): No drift  Motor Arm, Right (5b. ): No drift  Motor Leg, Left (6a. ): No drift  Motor Leg, Right (6b. ): No drift  Limb Ataxia (7. ): Absent  Sensory (8. ): Normal  Best Language (9. ): No aphasia  Dysarthria (10. ): Normal  Extinction and Inattention (11): No abnormality  Total: 0Glasgow Coma Scale  Eye Opening: Spontaneous  Best Motor Response: Obeys commands        PHYSICAL EXAM    (up to 7 for level 4, 8 or more for level 5)     ED Triage Vitals [07/04/21 1616]   BP Temp Temp Source Pulse Resp SpO2 Height Weight   (!) 140/69 98.1 °F (36.7 °C) Axillary 78 23 97 % 5' 3\" (1.6 m) 198 lb 6.6 oz (90 kg)       General: Alert and oriented appropriately for age, No acute distress. Eye: Normal conjunctiva. Pupils equal and reactive. HENT: Oral mucosa is moist.  Respiratory: Respirations even and non-labored. Lungs clear to auscultation bilaterally. Cardiovascular: Normal rate, Regular rhythm. Gastrointestinal: Soft, Non-tender, Non-distended. Musculoskeletal: No swelling.   Slight right shoulder pain with abduction, flexion. Slight decreased range of motion secondary to pain. Integumentary: Warm, Dry. Neurologic: Alert and appropriate for age. Cranial nerves II through XII intact.  strength 4 out of 5 on the right compared to 5 out of 5 on the left. Decreased sensation to temperature on the right upper and right lower extremity compared to left. The rest of the right upper extremity demonstrates 5 out of 5 strength, right lower extremity, left upper and left lower extremity demonstrate 5 out of 5 strength. There is no drift. Sensation is otherwise intact other than what is demonstrated above. No ataxia, no dysmetria, negative test of skew. Psychiatric: Cooperative. DIAGNOSTIC RESULTS     EKG: All EKG's are interpreted by the Emergency Department Physician who either signs or Co-signsthis chart in the absence of a cardiologist.    The Ekg interpreted by me shows  normal sinus rhythm with a rate of 78 bpm.  Axis is   Normal  QTc is  normal  Intervals and Durations are unremarkable. ST Segments: normal  No significant change from prior EKG dated June 11, 2020. RADIOLOGY:   Non-plain filmimages such as CT, Ultrasound and MRI are read by the radiologist. Plain radiographic images are visualized and preliminarily interpreted by the emergency physician with the below findings:      Interpretation per the Radiologist below, if available at the time ofthis note:     [facility]   Final Result   No large vessel occlusion or hemodynamic stenosis involving intracranial   cervical vessels. Left parietal abnormalities with ring enhancement.   Consider MRI further   characterization without and with contrast.         CT HEAD WO CONTRAST   Final Result   Focal intra-axial nodule or mass in the posterior left parietal lobe,   suspicious for intracranial metastasis, in the absence of clinical signs of   infection which would suggest that this could represent an abscess. .   Correlate for any history of known primary. Mild periventricular small vessel ischemic change, with mild atrophy. XR SHOULDER RIGHT (MIN 2 VIEWS)   Final Result   Degenerative change. No acute osseous abnormality         XR CHEST PORTABLE   Final Result   Negative low lung volume study.   No pneumonia or edema               ED BEDSIDE ULTRASOUND:   Performed by ED Physician - none    LABS:  Labs Reviewed   BASIC METABOLIC PANEL W/ REFLEX TO MG FOR LOW K - Abnormal; Notable for the following components:       Result Value    Glucose 100 (*)     GFR Non- 55 (*)     All other components within normal limits    Narrative:     Performed at:  [address] [facility] [employer]   Phone [phone]   URINALYSIS - Abnormal; Notable for the following components:    Leukocyte Esterase, Urine MODERATE (*)     All other components within normal limits    Narrative:     Performed at:  [address] [employer]   Phone [phone]   MICROSCOPIC URINALYSIS - Abnormal; Notable for the following components:    Bacteria, UA 1+ (*)     WBC, UA 9 (*)     All other components within normal limits    Narrative:     Performed at:  [address] [employer]   Phone [phone]   CBC WITH AUTO DIFFERENTIAL    Narrative:     Performed at:  [address] [employer]   Phone [phone]   PROTIME-INR    Narrative:     Performed at:  [facility]  [address] [place] [address] [facility] [employer]   Phone [phone]   TROPONIN    Narrative:     Performed at:  [facility]  [address] [place] [address] [facility] [employer]   Phone [phone]   POCT GLUCOSE    Narrative:     Performed at:  [facility]  [address]   Phone [phone]   POCT GLUCOSE       All other labs were within normal range or not returned as of this dictation. EMERGENCY DEPARTMENT COURSE and DIFFERENTIAL DIAGNOSIS/MDM:   Vitals:    Vitals:    21 1715 21 1745 21 1947 21   BP: 131/72 (!) 118/97 (!) 148/69 133/61   Pulse: 78 84 73 68   Resp: 19  18 17   Temp:       TempSrc:       SpO2: 95% 97% 96% 96%   Weight:       Height:             Medical decision makin-year-old female who presents with right hand clumsiness, right upper extremity, right lower extremity numbness that has been going on for at least the past 2 days, her last known normal was at least 2 days ago, not in the window for acute intervention with intra-arterial intervention or TPA if indeed a stroke. There is concern for stroke however, versus other intracranial process, getting CT, CTA, labs as above, EKG. EKG stable from prior, labs otherwise unremarkable. UA possibly consistent with infection but patient has no urinary symptoms, thus does not require treatment. CT findings demonstrate a left temporoparietal lesion that is concerning for metastasis with surrounding vasogenic edema. Given surrounding ring enhancement, also possible primary brain tumor, there are no infectious symptoms to suggest that this is an abscess. She is given dexamethasone to improvement in her symptoms, neurosurgery is consulted, recommend work-up with MRI with and without contrast, this can be done as an outpatient per neurosurgery as long as patient has close follow-up. She does with [facility], initially had paged Dr. Jj Castillo but patient no longer follows with her on our reassessment. Patient and her family member who is at bedside states that they will follow-up this week for MRI and will return to the emergency department if they cannot obtain it within the week.   Patient is otherwise feeling well, has resolution of her symptoms after dexamethasone given, given short course of prednisone for use for potential worsening edema but encouraged patient to return to the emergency department with any worsening of her symptoms. She voiced understanding, stable for and amenable to discharge home, understands of importance of follow-up and clinical indications, possible malignancy. Discharged home, ambulates steadily from the emergency department upon discharge. Medications   iopamidol (ISOVUE-370) 76 % injection 75 mL (75 mLs Intravenous Given 7/4/21 1707)   dexamethasone (PF) (DECADRON) injection 8 mg (8 mg Intravenous Given 7/4/21 1839)         CONSULTS:  Tila Blair  IP CONSULT TO NEUROSURGERY  IP CONSULT TO PRIMARY CARE PROVIDER    CRITICAL CARE TIME   Total Critical Care time was 35 minutes, excluding separately reportable procedures. There was a high probability of clinically significant/life threatening deterioration in the patient's condition which required my urgent intervention. Frequent reassessments of patient's hemodynamic status, neurologic status, treatment of patient's brain mass with vasogenic edema with IV dexamethasone to improvement in her neurologic symptoms, consultation with neurosurgery regarding the patient's case, discussion with family member in order to ensure arrangement of close outpatient follow-up. FINAL IMPRESSION      1. Vasogenic brain edema ([address]    2.  Mass of brain          DISPOSITION/PLAN   DISPOSITION Decision To Discharge 07/04/2021 07:43:00 PM      PATIENT REFERRED TO:  your primary care doctor at [facility] as we discussed          [facility] Emergency Department  [address]  [phone]    If symptoms worsen    [address] [facility] [address] [facility]  [address] [address]    In 2 days        DISCHARGE MEDICATIONS:  Discharge

## 2021-07-06 ENCOUNTER — HOSPITAL ENCOUNTER (EMERGENCY)
Age: 71
Discharge: ANOTHER HOSPITAL | End: 2021-07-06
Attending: EMERGENCY MEDICINE
Payer: MEDICARE

## 2021-07-06 VITALS
BODY MASS INDEX: 35.08 KG/M2 | HEART RATE: 55 BPM | RESPIRATION RATE: 14 BRPM | DIASTOLIC BLOOD PRESSURE: 65 MMHG | SYSTOLIC BLOOD PRESSURE: 109 MMHG | TEMPERATURE: 97.5 F | HEIGHT: 63 IN | OXYGEN SATURATION: 96 % | WEIGHT: 197.97 LBS

## 2021-07-06 DIAGNOSIS — G93.89 BRAIN MASS: Primary | ICD-10-CM

## 2021-07-06 DIAGNOSIS — R20.0 RIGHT SIDED NUMBNESS: ICD-10-CM

## 2021-07-06 LAB
ANION GAP SERPL CALCULATED.3IONS-SCNC: 10 MMOL/L (ref 3–16)
BASOPHILS ABSOLUTE: 0.1 K/UL (ref 0–0.2)
BASOPHILS RELATIVE PERCENT: 0.6 %
BUN BLDV-MCNC: 23 MG/DL (ref 7–20)
CALCIUM SERPL-MCNC: 9.1 MG/DL (ref 8.3–10.6)
CHLORIDE BLD-SCNC: 107 MMOL/L (ref 99–110)
CO2: 23 MMOL/L (ref 21–32)
CREAT SERPL-MCNC: 1.1 MG/DL (ref 0.6–1.2)
EOSINOPHILS ABSOLUTE: 0.1 K/UL (ref 0–0.6)
EOSINOPHILS RELATIVE PERCENT: 0.9 %
GFR AFRICAN AMERICAN: 60
GFR NON-AFRICAN AMERICAN: 49
GLUCOSE BLD-MCNC: 111 MG/DL (ref 70–99)
HCT VFR BLD CALC: 35.1 % (ref 36–48)
HEMOGLOBIN: 11.8 G/DL (ref 12–16)
LYMPHOCYTES ABSOLUTE: 4.3 K/UL (ref 1–5.1)
LYMPHOCYTES RELATIVE PERCENT: 33.2 %
MCH RBC QN AUTO: 27.3 PG (ref 26–34)
MCHC RBC AUTO-ENTMCNC: 33.6 G/DL (ref 31–36)
MCV RBC AUTO: 81.2 FL (ref 80–100)
MONOCYTES ABSOLUTE: 1.1 K/UL (ref 0–1.3)
MONOCYTES RELATIVE PERCENT: 8.2 %
NEUTROPHILS ABSOLUTE: 7.4 K/UL (ref 1.7–7.7)
NEUTROPHILS RELATIVE PERCENT: 57.1 %
PDW BLD-RTO: 14.8 % (ref 12.4–15.4)
PLATELET # BLD: 321 K/UL (ref 135–450)
PMV BLD AUTO: 9.4 FL (ref 5–10.5)
POTASSIUM REFLEX MAGNESIUM: 3.6 MMOL/L (ref 3.5–5.1)
RBC # BLD: 4.32 M/UL (ref 4–5.2)
SODIUM BLD-SCNC: 140 MMOL/L (ref 136–145)
WBC # BLD: 13 K/UL (ref 4–11)

## 2021-07-06 PROCEDURE — 85025 COMPLETE CBC W/AUTO DIFF WBC: CPT

## 2021-07-06 PROCEDURE — 80048 BASIC METABOLIC PNL TOTAL CA: CPT

## 2021-07-06 PROCEDURE — 99284 EMERGENCY DEPT VISIT MOD MDM: CPT

## 2021-07-06 NOTE — LETTER
[facility] Emergency Department  [address] [facility] [address]  Phone: [phone]             July 6, 2021                    To Whom It May Concern:    Julieta Gomez was at bedside in the Emergency Department with a patient throughout the day on July 6th, 2021 until patient was admitted.        Sincerely,             Signature:__________________________________

## 2021-07-06 NOTE — LETTER
[facility] Emergency Department  [facility] [address] [facility] [address]  Phone: [phone]             July 6, 2021                    To Whom It May Concern:    Cayla Melvin was at bedside with a patient in the Emergency Department on July 6th, 2021.        Sincerely,             Signature:__________________________________

## 2021-07-07 ENCOUNTER — APPOINTMENT (OUTPATIENT)
Dept: MRI IMAGING | Age: 71
DRG: 026 | End: 2021-07-07
Attending: INTERNAL MEDICINE
Payer: MEDICARE

## 2021-07-07 ENCOUNTER — APPOINTMENT (OUTPATIENT)
Dept: CT IMAGING | Age: 71
DRG: 026 | End: 2021-07-07
Attending: INTERNAL MEDICINE
Payer: MEDICARE

## 2021-07-07 ENCOUNTER — HOSPITAL ENCOUNTER (INPATIENT)
Age: 71
LOS: 3 days | Discharge: HOME | DRG: 026 | End: 2021-07-10
Attending: INTERNAL MEDICINE | Admitting: INTERNAL MEDICINE
Payer: MEDICARE

## 2021-07-07 DIAGNOSIS — G93.89 BRAIN MASS: Primary | ICD-10-CM

## 2021-07-07 PROCEDURE — 6360000002 HC RX W HCPCS: Performed by: NURSE PRACTITIONER

## 2021-07-07 PROCEDURE — 6360000004 HC RX CONTRAST MEDICATION: Performed by: INTERNAL MEDICINE

## 2021-07-07 PROCEDURE — 97116 GAIT TRAINING THERAPY: CPT

## 2021-07-07 PROCEDURE — 2580000003 HC RX 258: Performed by: NURSE PRACTITIONER

## 2021-07-07 PROCEDURE — 97161 PT EVAL LOW COMPLEX 20 MIN: CPT

## 2021-07-07 PROCEDURE — 97530 THERAPEUTIC ACTIVITIES: CPT

## 2021-07-07 PROCEDURE — 2060000000 HC ICU INTERMEDIATE R&B

## 2021-07-07 PROCEDURE — 2580000003 HC RX 258: Performed by: INTERNAL MEDICINE

## 2021-07-07 PROCEDURE — 6370000000 HC RX 637 (ALT 250 FOR IP): Performed by: INTERNAL MEDICINE

## 2021-07-07 PROCEDURE — A9579 GAD-BASE MR CONTRAST NOS,1ML: HCPCS | Performed by: INTERNAL MEDICINE

## 2021-07-07 PROCEDURE — 71260 CT THORAX DX C+: CPT

## 2021-07-07 PROCEDURE — 70553 MRI BRAIN STEM W/O & W/DYE: CPT

## 2021-07-07 RX ORDER — POLYETHYLENE GLYCOL 3350 17 G/17G
17 POWDER, FOR SOLUTION ORAL DAILY PRN
Status: DISCONTINUED | OUTPATIENT
Start: 2021-07-07 | End: 2021-07-08

## 2021-07-07 RX ORDER — AMLODIPINE BESYLATE 10 MG/1
10 TABLET ORAL DAILY
Status: DISCONTINUED | OUTPATIENT
Start: 2021-07-07 | End: 2021-07-10 | Stop reason: HOSPADM

## 2021-07-07 RX ORDER — ACETAMINOPHEN 325 MG/1
650 TABLET ORAL EVERY 6 HOURS PRN
Status: DISCONTINUED | OUTPATIENT
Start: 2021-07-07 | End: 2021-07-08

## 2021-07-07 RX ORDER — ACETAMINOPHEN 650 MG/1
650 SUPPOSITORY RECTAL EVERY 6 HOURS PRN
Status: DISCONTINUED | OUTPATIENT
Start: 2021-07-07 | End: 2021-07-08

## 2021-07-07 RX ORDER — LOSARTAN POTASSIUM 25 MG/1
100 TABLET ORAL DAILY
Status: DISCONTINUED | OUTPATIENT
Start: 2021-07-07 | End: 2021-07-10 | Stop reason: HOSPADM

## 2021-07-07 RX ORDER — SODIUM CHLORIDE 9 MG/ML
25 INJECTION, SOLUTION INTRAVENOUS PRN
Status: DISCONTINUED | OUTPATIENT
Start: 2021-07-07 | End: 2021-07-08

## 2021-07-07 RX ORDER — DEXAMETHASONE SODIUM PHOSPHATE 4 MG/ML
4 INJECTION, SOLUTION INTRA-ARTICULAR; INTRALESIONAL; INTRAMUSCULAR; INTRAVENOUS; SOFT TISSUE EVERY 6 HOURS
Status: DISCONTINUED | OUTPATIENT
Start: 2021-07-07 | End: 2021-07-09

## 2021-07-07 RX ORDER — SODIUM CHLORIDE 0.9 % (FLUSH) 0.9 %
5-40 SYRINGE (ML) INJECTION EVERY 12 HOURS SCHEDULED
Status: DISCONTINUED | OUTPATIENT
Start: 2021-07-07 | End: 2021-07-08

## 2021-07-07 RX ORDER — ONDANSETRON 4 MG/1
4 TABLET, ORALLY DISINTEGRATING ORAL EVERY 8 HOURS PRN
Status: DISCONTINUED | OUTPATIENT
Start: 2021-07-07 | End: 2021-07-08 | Stop reason: SDUPTHER

## 2021-07-07 RX ORDER — SODIUM CHLORIDE 0.9 % (FLUSH) 0.9 %
5-40 SYRINGE (ML) INJECTION PRN
Status: DISCONTINUED | OUTPATIENT
Start: 2021-07-07 | End: 2021-07-08

## 2021-07-07 RX ORDER — ONDANSETRON 2 MG/ML
4 INJECTION INTRAMUSCULAR; INTRAVENOUS EVERY 6 HOURS PRN
Status: DISCONTINUED | OUTPATIENT
Start: 2021-07-07 | End: 2021-07-08 | Stop reason: SDUPTHER

## 2021-07-07 RX ORDER — LEVOTHYROXINE SODIUM 0.03 MG/1
25 TABLET ORAL DAILY
Status: DISCONTINUED | OUTPATIENT
Start: 2021-07-07 | End: 2021-07-10 | Stop reason: HOSPADM

## 2021-07-07 RX ADMIN — AMLODIPINE BESYLATE 10 MG: 10 TABLET ORAL at 09:01

## 2021-07-07 RX ADMIN — LEVETIRACETAM 500 MG: 100 INJECTION, SOLUTION INTRAVENOUS at 20:31

## 2021-07-07 RX ADMIN — IOPAMIDOL 80 ML: 755 INJECTION, SOLUTION INTRAVENOUS at 08:40

## 2021-07-07 RX ADMIN — Medication 10 ML: at 09:02

## 2021-07-07 RX ADMIN — LEVOTHYROXINE SODIUM 25 MCG: 0.03 TABLET ORAL at 06:56

## 2021-07-07 RX ADMIN — DEXAMETHASONE SODIUM PHOSPHATE 4 MG: 4 INJECTION, SOLUTION INTRAMUSCULAR; INTRAVENOUS at 22:40

## 2021-07-07 RX ADMIN — GADOTERIDOL 19 ML: 279.3 INJECTION, SOLUTION INTRAVENOUS at 11:51

## 2021-07-07 RX ADMIN — LOSARTAN POTASSIUM 100 MG: 25 TABLET, FILM COATED ORAL at 09:00

## 2021-07-07 RX ADMIN — Medication 10 ML: at 20:38

## 2021-07-07 RX ADMIN — LEVETIRACETAM 500 MG: 100 INJECTION, SOLUTION INTRAVENOUS at 09:00

## 2021-07-07 RX ADMIN — DEXAMETHASONE SODIUM PHOSPHATE 4 MG: 4 INJECTION, SOLUTION INTRAMUSCULAR; INTRAVENOUS at 18:51

## 2021-07-07 ASSESSMENT — PAIN SCALES - GENERAL
PAINLEVEL_OUTOF10: 0

## 2021-07-07 NOTE — ED PROVIDER NOTES
HISTORY  Past Surgical History:   Procedure Laterality Date    BREAST BIOPSY      COLON SURGERY      TUBAL LIGATION         CURRENT MEDICATIONS  Current Outpatient Rx   Medication Sig Dispense Refill    amLODIPine (NORVASC) 10 MG tablet Take 10 mg by mouth daily      losartan (COZAAR) 100 MG tablet Take 100 mg by mouth daily      levothyroxine (SYNTHROID) 25 MCG tablet Take 25 mcg by mouth Daily      predniSONE (DELTASONE) 50 MG tablet Take 1 tablet by mouth daily for 5 days 5 tablet 0    MULTIPLE VITAMIN PO Take 1 tablet by mouth daily          ALLERGIES  No Known Allergies    Tetanus vaccination status reviewed: tetanus re-vaccination not indicated. PHYSICAL EXAM  VITAL SIGNS: /61   Pulse 65   Resp 18   Ht 5' 3\" (1.6 m)   Wt 197 lb 15.6 oz (89.8 kg)   SpO2 97%   BMI 35.07 kg/m²   Constitutional: Well-developed, well-nourished, appears slow to answer but otherwise normal, nontoxic, activity: Resting comfortably on the cart, no obvious pain  HENT: Normocephalic, Atraumatic, Bilateral external ears normal, TM's were normal, Mucus membranes are moist and oropharynx is patent and clear, No oral exudates, Nose normal.  Eyes: PERRLA, EOMI, Conjunctiva normal, No discharge. No scleral icterus. Neck: Normal range of motion, No tenderness, Supple. Lymphatic: No lymphadenopathy noted. Cardiovascular: Normal heart rate, Normal rhythm, no murmurs, no gallops, no rubs. Thorax & Lungs: Normal breath sounds, no respiratory distress, no wheezing, no rales, no rhonchi  Abdomen: Soft, Nontender, No hepatosplenomegaly, No masses, No pulsatile masses, No distension, normal bowel sounds  Skin: Warm, Dry, No erythema, No rash. Back: No tenderness, Full range of motion, No scoliosis. Extremities: No edema, No tenderness, No cyanosis, No clubbing. No amputations, capillary refill less than 2 seconds.   Musculoskeletal: Good range of motion in all major joints, No tenderness to palpation, no major deformities noted.  Neurologic: Alert & oriented x 3, CN II through XII are intact, normal motor function, decreased right-sided sensory function of the entire right side, no other focal deficits noted, no clonus, no tremors. Psychiatric: Affect normal, Mood normal.    NIH Stroke Scale-  NIH Stroke Scale  Interval: Baseline  Level of Consciousness (1a. ): Alert  LOC Questions (1b. ): Answers both correctly  LOC Commands (1c. ): Performs both tasks correctly  Best Gaze (2. ): Normal  Visual (3. ): No visual loss  Facial Palsy (4. ): Normal symmetrical movement  Motor Arm, Left (5a. ): No drift  Motor Arm, Right (5b. ): No drift  Motor Leg, Left (6a. ): No drift  Motor Leg, Right (6b. ): No drift  Limb Ataxia (7. ): Absent  Sensory (8. ): (!) Mild to Moderate  Best Language (9. ): No aphasia  Dysarthria (10. ): Normal  Extinction and Inattention (11): No abnormality  Total: 1     LABORATORY  Labs Reviewed   CBC WITH AUTO DIFFERENTIAL - Abnormal; Notable for the following components:       Result Value    WBC 13.0 (*)     Hemoglobin 11.8 (*)     Hematocrit 35.1 (*)     All other components within normal limits    Narrative:     Performed at:                  [address] [address]   Phone [phone]   BASIC METABOLIC PANEL W/ REFLEX TO MG FOR LOW K - Abnormal; Notable for the following components:    Glucose 111 (*)     BUN 23 (*)     GFR Non- 49 (*)     GFR  60 (*)     All other components within normal limits    Narrative:     Performed at:                  [place] [address] [address] [address]   Phone [phone]     RADIOLOGY/PROCEDURES  I personally reviewed the images for this case. No orders to display     CT scan of the brain the other day revealed a 2.0 x 1.2 cm mass in the left posterior parietal area.     COURSE & MEDICAL DECISION MAKING  I do not feel laboratory work or imaging is necessary at this time. Vitals:    07/06/21 1715   BP: 111/61   Pulse: 65   Resp: 18   SpO2: 97%   Weight: 197 lb 15.6 oz (89.8 kg)   Height: 5' 3\" (1.6 m)       Medications - No data to display    New Prescriptions    No medications on file       SEP-1 CORE MEASURE DATA  Exclusion criteria: the patient is NOT to be included for sepsis due to: Infection is not suspected    Patient remained stable in the ED. I spoke to Dr. Javier Rodríguez who stated that she needs to come [facility] to [facility] [facility]. for further evaluation treatment. I then spoke to Dr. rIasema Rodríguez the hospitalist who stated she would accept the patient for transfer. Patient be transferred by S. We are awaiting room assignment at this time. Patient status did not change while she was in the emergency department. The patient's blood pressure was not found to be elevated according to CMS/Medicare and the Affordable Care Act/ObamaCare criteria. I reviewed old records     (This chart has been completed using [address]. Although attempts have been made to ensure accuracy, words and/or phrases may not be transcribed as intended.)    Patient refused pain medicines at the time of their exam.    IMPRESSION(S):  1. Brain mass    2. Right sided numbness        ?   Recheck Times: 2100    Diagnostic considerations include but are not limited to:  thrombotic stroke, embolic stroke, hemorrhagic stroke, TIA,  hypoglycemia, mass lesions, metabolic cause, head injury, encephalopathy, multiple sclerosis, seizure, hypoxia, Bell's palsy, Jas's paralysis, infection/abscesses-intracranially or other spinal cord, other         Mendel Rundle, DO  07/06/21 5592

## 2021-07-07 NOTE — PROGRESS NOTES
Physical Therapy    Facility/Department: [facility] [address]  Initial Assessment/Treatment/Discharge Summary     NAME: Lary Hutson  : 1950  MRN: [id number]    Date of Service: 2021    Discharge Recommendations:    Lary Hutson scored a 21/24 on the AM-PAC short mobility form. Current research shows that an AM-PAC score of 18 or greater is typically associated with a discharge to the patient's home setting. Based on the patient's AM-PAC score and their current functional mobility deficits, it is recommended that the patient have 2-3 sessions per week of Physical Therapy at d/c to increase the patient's independence. At this time, this patient demonstrates the endurance and safety to discharge home with  (home vs OP services) and a follow up treatment frequency of 2-3x/wk. Please see assessment section for further patient specific details. If patient discharges prior to next session this note will serve as a discharge summary. Please see below for the latest assessment towards goals. PT Equipment Recommendations  Equipment Needed: No    Assessment   Assessment: Pt currently at or very near her functional baseline. Pt performing all functional mobility safely without physical assist. Pt reporting R sided strength deficits have almost completely resolved. Pt planning to return home with A from family. Pt verb no safety concerns about d/c home. Pt with no further acute PT needs at this time - will sign off from PT services. Prognosis: Good  Decision Making: Low Complexity  Patient Education: role of PT, use of call light, d/c planning; pt verb understanding  Barriers to Learning: none  REQUIRES PT FOLLOW UP: No  Activity Tolerance  Activity Tolerance: Patient Tolerated treatment well       Patient Diagnosis(es): There were no encounter diagnoses. has a past medical history of Colon cancer ([facility] [address] and High blood pressure. has a past surgical history that includes Breast biopsy;  Tubal ligation; and Colon surgery. Restrictions  Position Activity Restriction  Other position/activity restrictions: up with assist  Vision/Hearing  Vision: Within Functional Limits  Hearing: Exceptions to [facility]  Hearing Exceptions: Hard of hearing/hearing concerns     Subjective  General  Chart Reviewed: Yes  Additional Pertinent Hx: 78 y.o. female with medical history of HTN and colon cancer, treated in 2000 with partial colectomy and chemotherapy, who presents to [facility] with persistent right sided weakness. Patient reports her symptoms started on July 3. She was seen in ER and underwent head imaging that showed brain mass. Patient could not stay in the hospital and asked to be discharged. She could not get outpatient work up scheduled soon enough and returned to the hospital.  Family / Caregiver Present: No  Referring Practitioner: Alfred Hoffmann MD  Diagnosis: Brain Mass  Follows Commands: Within Functional Limits  Subjective  Subjective: Pt found supine in bed upon arrival, denying pain and agreeable to therapy.         Orientation  Orientation  Overall Orientation Status: Within Functional Limits  Social/Functional History  Social/Functional History  Lives With: Daughter (+  son and grandkids 25, 12, 8)  Type of Home: House  Home Layout: Bed/Bath upstairs, Two level, 1/2 bath on main level, Able to Live on Main level with bedroom/bathroom (20 steps up to bedroom)  Home Access: Stairs to enter without rails  Entrance Stairs - Number of Steps: 1 FRANNY  Bathroom Shower/Tub: Tub/Shower unit  Bathroom Toilet: Standard (sink for leverage)  Home Equipment:  (owns no DME)  ADL Assistance: Independent  Homemaking Assistance: Needs assistance (pt cooks and does laundry, daughter gets groceries)  Ambulation Assistance: Independent  Transfer Assistance: Independent  Active : No  Patient's  Info: Daughter drives  Occupation: Retired  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 States she got a little banged up but nothing serious. Cognition        Objective  AROM RLE (degrees)  RLE AROM: WFL  AROM LLE (degrees)  LLE AROM : WFL  Strength RLE  Strength RLE: WFL  Strength LLE  Strength LLE: WFL        Bed mobility  Supine to Sit: Independent  Scooting: Independent  Transfers  Sit to Stand: Independent  Stand to sit: Independent  Ambulation  Ambulation?: Yes  Ambulation 1  Surface: level tile  Device: No Device  Assistance: Stand by assistance;Supervision  Quality of Gait: moderate leo, stride length and Lucy. Overall steady with no LOB or near LOB.   Distance: 3'+400'+250'  Stairs/Curb  Stairs?: Yes  Stairs  # Steps : 16 (4+12)  Stairs Height: 6\"  Device: No Device     Balance  Posture: Good  Sitting - Static: Good  Sitting - Dynamic: Good  Standing - Static: Good  Standing - Dynamic: Good      Plan   Plan  Times per week: d/c acute PT  Safety Devices  Type of devices: Left in chair, Call light within reach, Gait belt, Nurse notified, Chair alarm in place    AM-PAC Score  AM-PAC Inpatient Mobility Raw Score : 21 (07/07/21 1531)  AM-PAC Inpatient T-Scale Score : 50.25 (07/07/21 1531)  Mobility Inpatient CMS 0-100% Score: 28.97 (07/07/21 1531)  Mobility Inpatient CMS G-Code Modifier : CJ (07/07/21 1531)     Therapy Time   Individual Concurrent Group Co-treatment   Time In 1430         Time Out 1510         Minutes 40           Timed Code Treatment Minutes:  25    Total Treatment Minutes:  40     Eva Brink, PT, DPT

## 2021-07-07 NOTE — PROGRESS NOTES
Pharmacy Medication Reconciliation Note     List of medications patient is currently taking is complete. Source of information:   1. Per conversations with patient and family at bedside   2. Med bottles from home     Notes regarding home medications:   1. Patient gave me all of her home med bottles and told me she had all meds PTA  2.  Patient was given oral prednisone 50 to take for swelling in her head--patient and family a bit confused on when to start so they have not given it yet    Patient denies taking any other OTC or herbal medications    Jose Munoz, Pharmacy Intern  7/6/2021  9:57 PM

## 2021-07-07 NOTE — PROGRESS NOTES
4 Eyes Admission Assessment     I agree as the admission nurse that 2 RN's have performed a thorough Head to Toe Skin Assessment on the patient. ALL assessment sites listed below have been assessed on admission. Areas assessed by both nurses:   [x]   Head, Face, and Ears   [x]   Shoulders, Back, and Chest  [x]   Arms, Elbows, and Hands   [x]   Coccyx, Sacrum, and Ischium  [x]   Legs, Feet, and Heels        Does the Patient have Skin Breakdown?   No         Humberto Prevention initiated:  NA   Wound Care Orders initiated:  NA      WOC nurse consulted for Pressure Injury (Stage 3,4, Unstageable, DTI, NWPT, and Complex wounds) or Humberto score 18 or lower:  NA      Nurse 1 eSignature: Electronically signed by Marieols Caballero RN on 7/7/21 at 4:31 AM EDT    **SHARE this note so that the co-signing nurse is able to place an eSignature**    Nurse 2 eSignature: Electronically signed by Du Francisco RN on 7/7/21 at 4:31 AM EDT

## 2021-07-07 NOTE — ED NOTES
Daughter updated on transportation ETA. Daughter reports that patient has had increased depression since diagnosis.       Linsey Colon RN  07/06/21 6286

## 2021-07-07 NOTE — H&P
home.     TOBACCO:   reports that she quit smoking about 34 years ago. Her smoking use included cigarettes. She has a 0.75 pack-year smoking history. She has never used smokeless tobacco.  ETOH:   reports no history of alcohol use. Family History:  Reviewed in detail and Positive as follows:        Problem Relation Age of Onset    No Known Problems Other         lung disease        REVIEW OF SYSTEMS:   Positive and negative as noted in the HPI. All other systems reviewed and negative. PHYSICAL EXAM:    BP (!) 144/79   Pulse 59   Temp 97.8 °F (36.6 °C) (Oral)   Resp 16   Ht 5' 3\" (1.6 m)   Wt 197 lb (89.4 kg)   SpO2 99%   BMI 34.90 kg/m²     General appearance: No apparent distress appears stated age and cooperative. HEENT Normal cephalic, atraumatic without obvious deformity. Conjunctivae/corneas clear. Neck: Supple, No jugular venous distention/bruits. Trachea midline without thyromegaly or adenopathy with full range of motion. Lungs: Clear to auscultation, bilaterally without Rales/Wheezes/Rhonchi with good respiratory effort. Heart: Regular rate and rhythm with Normal S1/S2 without murmurs, rubs or gallops, point of maximum impulse non-displaced  Abdomen: Soft, non-tender or non-distended without rigidity or guarding and positive bowel sounds all four quadrants. Extremities: No clubbing, cyanosis, or edema bilaterally. Skin: Skin color, texture, turgor normal.  No rashes or lesions. Neurologic: Alert and oriented X 3, slight dysarthria present with right sided facial droop, and UE and LE weakness 4/5  Mental status: Alert, oriented, thought content appropriate. Capillary refill is brisk  Peripheral pulses 2+    CT head 7/4/201: Focal intra-axial nodule or mass in the posterior left parietal lobe,   suspicious for intracranial metastasis, in the absence of clinical signs of   infection which would suggest that this could represent an abscess. .   Correlate for any history of known primary.       Mild periventricular small vessel ischemic change, with mild atrophy. EKG:  I have reviewed the EKG with the following interpretation: normal sinus    CBC   Recent Labs     07/04/21  1622 07/06/21  1725   WBC 9.6 13.0*   HGB 12.0 11.8*   HCT 36.0 35.1*    321      RENAL  Recent Labs     07/04/21  1622 07/06/21  1725    140   K 4.1 3.6    107   CO2 22 23   BUN 18 23*   CREATININE 1.0 1.1     LFT'S  No results for input(s): AST, ALT, ALB, BILIDIR, BILITOT, ALKPHOS in the last 72 hours. COAG  Recent Labs     07/04/21  1622   INR 1.05     CARDIAC ENZYMES  Recent Labs     07/04/21  1622   TROPONINI <0.01       U/A:    Lab Results   Component Value Date    COLORU YELLOW 07/04/2021    WBCUA 9 07/04/2021    RBCUA 1 07/04/2021    BACTERIA 1+ 07/04/2021    CLARITYU Clear 07/04/2021    SPECGRAV >1.030 07/04/2021    LEUKOCYTESUR MODERATE 07/04/2021    BLOODU Negative 07/04/2021    GLUCOSEU Negative 07/04/2021       ABG  No results found for: XSM7ZME, BEART, S0QOPUPB, PHART, THGBART, XZX4JXC, PO2ART, XAX0ZEY        Active Hospital Problems    Diagnosis Date Noted    Brain mass [G93.89] 07/06/2021         ASSESSMENT/PLAN:    Brain mass with right sided weakness:  Cont with neurochecks  Brain MRI pending  On keppra  Holding off steroids per recommendation of neurosurgery  PT/OT  Will involve medical oncology given history of colon cancer and metastatic appearance on CT. H/o colon cancer in 2000:  CT chest/A/P- negative for masses  [facility] consult    HTN:  Continue with home medications    DVT Prophylaxis: SCD  Diet: ADULT DIET; Regular  Code Status: Full Code  PT/OT Eval Status: ordered    Belen Mcdaniel MD    Thank you No primary care provider on file. for the opportunity to be involved in this patient's care. If you have any questions or concerns please feel free to contact me at [phone].

## 2021-07-07 NOTE — PROGRESS NOTES
RN screened patients swallowing by administering the [facility] Protocol. The patient consumed 3 ounces of water by straw in sequential swallows without signs/symptoms of aspiration.

## 2021-07-07 NOTE — ED NOTES
Report to [facility] at this time. Pt. Alert and oriented and VSS upon departure.       Rosalio Vasquez RN  07/06/21 6011

## 2021-07-07 NOTE — CARE COORDINATION
Patient from home and independent prior to admissions. SW met with patient at bedside. Patient declined any SW needs at this time . SW available should needs arise.      ROSEANN Fox, [facility]  Social Work/Case Management  [facility]   [phone]

## 2021-07-07 NOTE — PROGRESS NOTES
Patient is alert and oriented x4. VSS. Patient is x1 SBA to bathroom. Voids adequately. Tolerates diet and fluids well. Bed in lowest position and call light within reach. Will continue to monitor.

## 2021-07-07 NOTE — PROGRESS NOTES
Admitted to 5 tower. Weakness noted to R side however pt states her sensation is improved since yesterday. VSS. Skin intact. PT SBA. Voids WNL. Passed bedside swallow eval. Bed alarm set. Call light in reach. Will continue to monitor.

## 2021-07-07 NOTE — CONSULTS
NEUROSURGERY Evie Sandra  [id number]   1950 7/7/2021    Requesting physician: Thad Guzman MD    Reason for consultation: brain mass     History of present illness: The patient is a 78 y.o. female with medical history of HTN and colon cancer, treated in 2000 with partial colectomy and chemotherapy, who presents to [facility] with persistent right sided weakness/lethargy. Patient reports her symptoms started on July 3. She was at her boyfriends house and could hardly stay awake, she then noticed difficulty sitting up on her own and some right sided weakness. She did have a fall at that time, felt she got dizzy and blacked out. She was seen in ER at outside hospital on 7/4 where a CT head was done, it was concerning for a mass in her left parietal lobe. Patient did not want to be admitted at that time and she was told to f/u as an outpatient by the ER provider. She returned to the ER because she states she could not get an MRI scheduled soon enough. She reports that she has had no change in her original symptoms, feels that her lethargy is somewhat improved, but her right side still feels \"off\". She denies any headache, blurry vision, falls, N/V/D. No chest or abdominal pain. She has not followed up with oncology recently for h/o colon cancer. She was transferred to [facility] for neurosurgical evaluation. ROS:   GENERAL:  Denies fever or recent illness.  Denies weight changes   EYES:  Denies vision change or diplopia  EARS:  Denies hearing loss  CARDIAC:  Denies chest pain  RESPIRATORY:  Denies shortness of breath  SKIN:  Denies rash or lesions   HEM:  Denies excessive bruising  PSYCH:  Denies anxiety or depression  NEURO:  + right sided weakness/numbness, + lethargy    :  Denies urinary difficulty  GI: Denies nausea, vomiting, diarrhea or constipation  MUSCULOSKELETAL:  No arthralgias    No Known Allergies    Past Medical History:   Diagnosis Date    Colon cancer ([facility] [address]     High blood pressure         Past Surgical History:   Procedure Laterality Date    BREAST BIOPSY      COLON SURGERY      TUBAL LIGATION         Social History     Occupational History    Not on file   Tobacco Use    Smoking status: Former Smoker     Packs/day: 0.25     Years: 3.00     Pack years: 0.75     Types: Cigarettes     Quit date: 1986     Years since quittin.9    Smokeless tobacco: Never Used   Vaping Use    Vaping Use: Never used   Substance and Sexual Activity    Alcohol use: No    Drug use: No    Sexual activity: Not on file        Family History   Problem Relation Age of Onset    No Known Problems Other         lung disease         Outpatient Medications Marked as Taking for the 21 encounter [facility] Encounter)   Medication Sig Dispense Refill    amLODIPine (NORVASC) 10 MG tablet Take 10 mg by mouth daily      losartan (COZAAR) 100 MG tablet Take 100 mg by mouth daily      levothyroxine (SYNTHROID) 25 MCG tablet Take 25 mcg by mouth Daily      MULTIPLE VITAMIN PO Take 1 tablet by mouth daily         Current Facility-Administered Medications   Medication Dose Route Frequency Provider Last Rate Last Admin    amLODIPine (NORVASC) tablet 10 mg  10 mg Oral Daily Albania Rivero MD        levothyroxine (SYNTHROID) tablet 25 mcg  25 mcg Oral Daily Albania Rivero MD   25 mcg at 21 0656    losartan (COZAAR) tablet 100 mg  100 mg Oral Daily Karen Mccormack MD        sodium chloride flush 0.9 % injection 5-40 mL  5-40 mL Intravenous 2 times per day Albania Rivero MD        sodium chloride flush 0.9 % injection 5-40 mL  5-40 mL Intravenous PRN Albania Rivero MD        0.9 % sodium chloride infusion  25 mL Intravenous PRN Albania Rivero MD        ondansetron (ZOFRAN-ODT) disintegrating tablet 4 mg  4 mg Oral Q8H PRN Albania Rivero MD        Or    ondansetron (ZOFRAN) injection 4 mg  4 mg Intravenous Q6H PRN Karen MOSES Micael Dubin, MD        polyethylene glycol (GLYCOLAX) packet 17 g  17 g Oral Daily PRN Chelo Garcia MD        acetaminophen (TYLENOL) tablet 650 mg  650 mg Oral Q6H PRN Chelo Garcia MD        Or    acetaminophen (TYLENOL) suppository 650 mg  650 mg Rectal Q6H PRN Chelo Garcia MD        levETIRAcetam (KEPPRA) 500 mg/100 mL IVPB  500 mg Intravenous Q12H BLAYNE Ho NP          Objective:  BP (!) 144/79   Pulse 59   Temp 97.8 °F (36.6 °C) (Oral)   Resp 16   Ht 5' 3\" (1.6 m)   Wt 197 lb (89.4 kg)   SpO2 99%   BMI 34.90 kg/m²     Physical Exam:  Patient seen and examined   General: Well developed. Alert and cooperative in no acute distress. HENT: atraumatic, neck supple  Eyes: Optic discs: Not tested  Pulmonary: unlabored respiratory effort  Cardiovascular:  Warm well perfused.  No peripheral edema  Gastrointestinal: abdomen soft, NT, ND    Neurologic Exam:  Neurological:  Mental Status: Awake, alert, oriented x 4   Attention: Intact  Language: speech slightly delayed/dysarthric at BASELINE  Sensation: Intact to all extremities to light touch  Coordination: Intact    Cranial Nerves:  Cranial Nerves:  II: Visual acuity not tested, denies new visual changes / diplopia  III, IV, VI: PERRL, 3 mm bilaterally, EOMI, no nystagmus noted  V: Facial sensation intact bilaterally to touch  VII: mild right facial weakness with baseline dysarthria   VIII: Hearing intact bilaterally to spoken voice  IX: Palate movement equal bilaterally  XI: Shoulder shrug equal bilaterally  XII: Tongue midline    Musculoskeletal:   Gait: Not tested   Assist devices: None   Tone: normal   Motor strength: LUE/LLE 5/5, RUE/RLE 4/5    Radiological Findings:  CT head wo contrast  74/2021 0706  Focal intra-axial nodule or mass in the posterior left parietal lobe, suspicious for intracranial metastasis, in the absence of clinical signs of infection which would suggest that this could represent an abscess. . Correlate for any history of known primary. Mild periventricular small vessel ischemic change, with mild atrophy. Labs  Recent Labs     07/04/21  1622 07/06/21  1725    140    107   CO2 22 23   BUN 18 23*   CREATININE 1.0 1.1   GLUCOSE 100* 111*     Recent Labs     07/04/21  1622 07/06/21  1725   WBC 9.6 13.0*   RBC 4.45 4.32   INR 1.05  --        Patient Active Problem List    Diagnosis Date Noted    Brain mass 07/06/2021    Chronic cough 08/09/2019    Asthma 04/02/2019    Sleep apnea 04/02/2019    SOB (shortness of breath)        Assessment:  77 yo female with PMH of and colon CA, admitted with right sided weakness/numbness. Head CT with concern for brain metastasis in left parietal lobe. Plan:        1. No emergent neurosurgical intervention indicated        2. Neurologic exams frequency: q 4   3. MRI brain w wo contrast, CT chest abdomen pelvis to assess for other malignancy  4. Cerebral edema:  - Keep sodium WNL  - Keep HOB >30 degrees  - NO dextrose in IVF's or in IV drips  5. Seizure prophylaxis: Keppra 500 mg BID  6. Advance diet / activity per primary team  7. Oncology consulted   8. Thanks for consult. Will follow closely. Please call w/ questions or decline in mental status       DISPO-INPATIENT  BLAYNE Wesley-CNP  Neurosurgery Nurse Practitioner  [phone]    Patient was seen and examined with Dr. Marlon Cabot who agrees with above assessment and plan. Electronically signed by:  BLAYNE Gudino - NP, 7/7/2021 7:10 AM

## 2021-07-08 ENCOUNTER — ANESTHESIA EVENT (OUTPATIENT)
Dept: OPERATING ROOM | Age: 71
DRG: 026 | End: 2021-07-08
Payer: MEDICARE

## 2021-07-08 ENCOUNTER — APPOINTMENT (OUTPATIENT)
Dept: CT IMAGING | Age: 71
DRG: 026 | End: 2021-07-08
Attending: INTERNAL MEDICINE
Payer: MEDICARE

## 2021-07-08 ENCOUNTER — ANESTHESIA (OUTPATIENT)
Dept: OPERATING ROOM | Age: 71
DRG: 026 | End: 2021-07-08
Payer: MEDICARE

## 2021-07-08 VITALS — OXYGEN SATURATION: 96 % | DIASTOLIC BLOOD PRESSURE: 62 MMHG | TEMPERATURE: 96.4 F | SYSTOLIC BLOOD PRESSURE: 128 MMHG

## 2021-07-08 LAB
ABO/RH: NORMAL
ANION GAP SERPL CALCULATED.3IONS-SCNC: 10 MMOL/L (ref 3–16)
ANTIBODY SCREEN: NORMAL
APTT: 29.1 SEC (ref 26.2–38.6)
BASOPHILS ABSOLUTE: 0 K/UL (ref 0–0.2)
BASOPHILS RELATIVE PERCENT: 0 %
BUN BLDV-MCNC: 9 MG/DL (ref 7–20)
CALCIUM SERPL-MCNC: 8.7 MG/DL (ref 8.3–10.6)
CHLORIDE BLD-SCNC: 100 MMOL/L (ref 99–110)
CO2: 26 MMOL/L (ref 21–32)
CREAT SERPL-MCNC: 0.8 MG/DL (ref 0.6–1.2)
EOSINOPHILS ABSOLUTE: 0 K/UL (ref 0–0.6)
EOSINOPHILS RELATIVE PERCENT: 0 %
GFR AFRICAN AMERICAN: >60
GFR NON-AFRICAN AMERICAN: >60
GLUCOSE BLD-MCNC: 145 MG/DL (ref 70–99)
GLUCOSE BLD-MCNC: 150 MG/DL (ref 70–99)
GLUCOSE BLD-MCNC: 160 MG/DL (ref 70–99)
HCT VFR BLD CALC: 37.3 % (ref 36–48)
HEMOGLOBIN: 12.6 G/DL (ref 12–16)
INR BLD: 0.96 (ref 0.88–1.12)
INR BLD: 1 (ref 0.88–1.12)
LYMPHOCYTES ABSOLUTE: 1.1 K/UL (ref 1–5.1)
LYMPHOCYTES RELATIVE PERCENT: 11.6 %
MCH RBC QN AUTO: 27.7 PG (ref 26–34)
MCHC RBC AUTO-ENTMCNC: 33.8 G/DL (ref 31–36)
MCV RBC AUTO: 81.8 FL (ref 80–100)
MONOCYTES ABSOLUTE: 0.1 K/UL (ref 0–1.3)
MONOCYTES RELATIVE PERCENT: 1 %
NEUTROPHILS ABSOLUTE: 8.4 K/UL (ref 1.7–7.7)
NEUTROPHILS RELATIVE PERCENT: 87.4 %
PDW BLD-RTO: 14.6 % (ref 12.4–15.4)
PERFORMED ON: ABNORMAL
PERFORMED ON: ABNORMAL
PLATELET # BLD: 346 K/UL (ref 135–450)
PMV BLD AUTO: 9 FL (ref 5–10.5)
POTASSIUM REFLEX MAGNESIUM: 4 MMOL/L (ref 3.5–5.1)
PROTHROMBIN TIME: 10.8 SEC (ref 9.9–12.7)
PROTHROMBIN TIME: 11.3 SEC (ref 9.9–12.7)
RBC # BLD: 4.56 M/UL (ref 4–5.2)
SODIUM BLD-SCNC: 136 MMOL/L (ref 136–145)
WBC # BLD: 9.6 K/UL (ref 4–11)

## 2021-07-08 PROCEDURE — 6360000002 HC RX W HCPCS: Performed by: NEUROLOGICAL SURGERY

## 2021-07-08 PROCEDURE — 80048 BASIC METABOLIC PNL TOTAL CA: CPT

## 2021-07-08 PROCEDURE — 00B70ZX EXCISION OF CEREBRAL HEMISPHERE, OPEN APPROACH, DIAGNOSTIC: ICD-10-PCS | Performed by: NEUROLOGICAL SURGERY

## 2021-07-08 PROCEDURE — 70450 CT HEAD/BRAIN W/O DYE: CPT

## 2021-07-08 PROCEDURE — 6370000000 HC RX 637 (ALT 250 FOR IP): Performed by: NEUROLOGICAL SURGERY

## 2021-07-08 PROCEDURE — 2580000003 HC RX 258: Performed by: NEUROLOGICAL SURGERY

## 2021-07-08 PROCEDURE — 85610 PROTHROMBIN TIME: CPT

## 2021-07-08 PROCEDURE — 6370000000 HC RX 637 (ALT 250 FOR IP): Performed by: INTERNAL MEDICINE

## 2021-07-08 PROCEDURE — 3600000014 HC SURGERY LEVEL 4 ADDTL 15MIN: Performed by: NEUROLOGICAL SURGERY

## 2021-07-08 PROCEDURE — 85730 THROMBOPLASTIN TIME PARTIAL: CPT

## 2021-07-08 PROCEDURE — 2580000003 HC RX 258: Performed by: NURSE PRACTITIONER

## 2021-07-08 PROCEDURE — 6360000002 HC RX W HCPCS: Performed by: NURSE PRACTITIONER

## 2021-07-08 PROCEDURE — 3700000000 HC ANESTHESIA ATTENDED CARE: Performed by: NEUROLOGICAL SURGERY

## 2021-07-08 PROCEDURE — 2060000000 HC ICU INTERMEDIATE R&B

## 2021-07-08 PROCEDURE — 7100000001 HC PACU RECOVERY - ADDTL 15 MIN: Performed by: NEUROLOGICAL SURGERY

## 2021-07-08 PROCEDURE — 86901 BLOOD TYPING SEROLOGIC RH(D): CPT

## 2021-07-08 PROCEDURE — C1713 ANCHOR/SCREW BN/BN,TIS/BN: HCPCS | Performed by: NEUROLOGICAL SURGERY

## 2021-07-08 PROCEDURE — 86900 BLOOD TYPING SEROLOGIC ABO: CPT

## 2021-07-08 PROCEDURE — 2709999900 HC NON-CHARGEABLE SUPPLY: Performed by: NEUROLOGICAL SURGERY

## 2021-07-08 PROCEDURE — 88331 PATH CONSLTJ SURG 1 BLK 1SPC: CPT

## 2021-07-08 PROCEDURE — 36415 COLL VENOUS BLD VENIPUNCTURE: CPT

## 2021-07-08 PROCEDURE — 85025 COMPLETE CBC W/AUTO DIFF WBC: CPT

## 2021-07-08 PROCEDURE — 2500000003 HC RX 250 WO HCPCS: Performed by: NEUROLOGICAL SURGERY

## 2021-07-08 PROCEDURE — 2500000003 HC RX 250 WO HCPCS: Performed by: ANESTHESIOLOGY

## 2021-07-08 PROCEDURE — 6360000002 HC RX W HCPCS: Performed by: ANESTHESIOLOGY

## 2021-07-08 PROCEDURE — 2580000003 HC RX 258: Performed by: ANESTHESIOLOGY

## 2021-07-08 PROCEDURE — 86850 RBC ANTIBODY SCREEN: CPT

## 2021-07-08 PROCEDURE — 7100000000 HC PACU RECOVERY - FIRST 15 MIN: Performed by: NEUROLOGICAL SURGERY

## 2021-07-08 PROCEDURE — 2580000003 HC RX 258: Performed by: INTERNAL MEDICINE

## 2021-07-08 PROCEDURE — 3600000004 HC SURGERY LEVEL 4 BASE: Performed by: NEUROLOGICAL SURGERY

## 2021-07-08 PROCEDURE — 2500000003 HC RX 250 WO HCPCS: Performed by: NURSE ANESTHETIST, CERTIFIED REGISTERED

## 2021-07-08 PROCEDURE — 3700000001 HC ADD 15 MINUTES (ANESTHESIA): Performed by: NEUROLOGICAL SURGERY

## 2021-07-08 DEVICE — COVER BUR H DIA12MM CRANIOMAXILLOFACIAL TI BLU FOR: Type: IMPLANTABLE DEVICE | Status: FUNCTIONAL

## 2021-07-08 DEVICE — SCREW BNE L4MM DIA1.5MM CRANIOFACIAL TI SELF DRL: Type: IMPLANTABLE DEVICE | Status: FUNCTIONAL

## 2021-07-08 RX ORDER — POLYETHYLENE GLYCOL 3350 17 G/17G
17 POWDER, FOR SOLUTION ORAL DAILY
Status: DISCONTINUED | OUTPATIENT
Start: 2021-07-08 | End: 2021-07-10 | Stop reason: HOSPADM

## 2021-07-08 RX ORDER — NALOXONE HYDROCHLORIDE 0.4 MG/ML
0.2 INJECTION, SOLUTION INTRAMUSCULAR; INTRAVENOUS; SUBCUTANEOUS PRN
Status: DISCONTINUED | OUTPATIENT
Start: 2021-07-08 | End: 2021-07-10 | Stop reason: HOSPADM

## 2021-07-08 RX ORDER — ROCURONIUM BROMIDE 10 MG/ML
INJECTION, SOLUTION INTRAVENOUS PRN
Status: DISCONTINUED | OUTPATIENT
Start: 2021-07-08 | End: 2021-07-08 | Stop reason: SDUPTHER

## 2021-07-08 RX ORDER — DEXTROSE MONOHYDRATE 25 G/50ML
12.5 INJECTION, SOLUTION INTRAVENOUS PRN
Status: DISCONTINUED | OUTPATIENT
Start: 2021-07-08 | End: 2021-07-09

## 2021-07-08 RX ORDER — ONDANSETRON 2 MG/ML
4 INJECTION INTRAMUSCULAR; INTRAVENOUS
Status: DISCONTINUED | OUTPATIENT
Start: 2021-07-08 | End: 2021-07-08 | Stop reason: HOSPADM

## 2021-07-08 RX ORDER — DEXTROSE MONOHYDRATE 50 MG/ML
100 INJECTION, SOLUTION INTRAVENOUS PRN
Status: DISCONTINUED | OUTPATIENT
Start: 2021-07-08 | End: 2021-07-10 | Stop reason: HOSPADM

## 2021-07-08 RX ORDER — SODIUM CHLORIDE, SODIUM LACTATE, POTASSIUM CHLORIDE, CALCIUM CHLORIDE 600; 310; 30; 20 MG/100ML; MG/100ML; MG/100ML; MG/100ML
INJECTION, SOLUTION INTRAVENOUS CONTINUOUS PRN
Status: DISCONTINUED | OUTPATIENT
Start: 2021-07-08 | End: 2021-07-08 | Stop reason: SDUPTHER

## 2021-07-08 RX ORDER — SODIUM CHLORIDE 9 MG/ML
INJECTION, SOLUTION INTRAVENOUS CONTINUOUS
Status: DISCONTINUED | OUTPATIENT
Start: 2021-07-08 | End: 2021-07-09

## 2021-07-08 RX ORDER — SODIUM CHLORIDE 0.9 % (FLUSH) 0.9 %
10 SYRINGE (ML) INJECTION EVERY 12 HOURS SCHEDULED
Status: DISCONTINUED | OUTPATIENT
Start: 2021-07-08 | End: 2021-07-10 | Stop reason: HOSPADM

## 2021-07-08 RX ORDER — DEXAMETHASONE SODIUM PHOSPHATE 4 MG/ML
INJECTION, SOLUTION INTRA-ARTICULAR; INTRALESIONAL; INTRAMUSCULAR; INTRAVENOUS; SOFT TISSUE PRN
Status: DISCONTINUED | OUTPATIENT
Start: 2021-07-08 | End: 2021-07-08 | Stop reason: SDUPTHER

## 2021-07-08 RX ORDER — CEFAZOLIN SODIUM 1 G/3ML
INJECTION, POWDER, FOR SOLUTION INTRAMUSCULAR; INTRAVENOUS PRN
Status: DISCONTINUED | OUTPATIENT
Start: 2021-07-08 | End: 2021-07-08 | Stop reason: SDUPTHER

## 2021-07-08 RX ORDER — MEPERIDINE HYDROCHLORIDE 25 MG/ML
12.5 INJECTION INTRAMUSCULAR; INTRAVENOUS; SUBCUTANEOUS EVERY 5 MIN PRN
Status: DISCONTINUED | OUTPATIENT
Start: 2021-07-08 | End: 2021-07-08 | Stop reason: HOSPADM

## 2021-07-08 RX ORDER — HEPARIN SODIUM 5000 [USP'U]/ML
5000 INJECTION, SOLUTION INTRAVENOUS; SUBCUTANEOUS EVERY 8 HOURS
Status: DISCONTINUED | OUTPATIENT
Start: 2021-07-09 | End: 2021-07-10 | Stop reason: HOSPADM

## 2021-07-08 RX ORDER — SODIUM CHLORIDE, SODIUM LACTATE, POTASSIUM CHLORIDE, AND CALCIUM CHLORIDE .6; .31; .03; .02 G/100ML; G/100ML; G/100ML; G/100ML
IRRIGANT IRRIGATION PRN
Status: DISCONTINUED | OUTPATIENT
Start: 2021-07-08 | End: 2021-07-08 | Stop reason: HOSPADM

## 2021-07-08 RX ORDER — ACETAMINOPHEN 325 MG/1
650 TABLET ORAL EVERY 4 HOURS PRN
Status: DISCONTINUED | OUTPATIENT
Start: 2021-07-08 | End: 2021-07-10 | Stop reason: HOSPADM

## 2021-07-08 RX ORDER — PROCHLORPERAZINE EDISYLATE 5 MG/ML
5 INJECTION INTRAMUSCULAR; INTRAVENOUS
Status: DISCONTINUED | OUTPATIENT
Start: 2021-07-08 | End: 2021-07-08 | Stop reason: HOSPADM

## 2021-07-08 RX ORDER — LIDOCAINE HYDROCHLORIDE 10 MG/ML
INJECTION, SOLUTION EPIDURAL; INFILTRATION; INTRACAUDAL; PERINEURAL PRN
Status: DISCONTINUED | OUTPATIENT
Start: 2021-07-08 | End: 2021-07-08 | Stop reason: SDUPTHER

## 2021-07-08 RX ORDER — LEVETIRACETAM 500 MG/5ML
INJECTION, SOLUTION, CONCENTRATE INTRAVENOUS PRN
Status: DISCONTINUED | OUTPATIENT
Start: 2021-07-08 | End: 2021-07-08 | Stop reason: SDUPTHER

## 2021-07-08 RX ORDER — SODIUM CHLORIDE 9 MG/ML
25 INJECTION, SOLUTION INTRAVENOUS PRN
Status: DISCONTINUED | OUTPATIENT
Start: 2021-07-08 | End: 2021-07-10 | Stop reason: HOSPADM

## 2021-07-08 RX ORDER — TRAMADOL HYDROCHLORIDE 50 MG/1
50 TABLET ORAL EVERY 6 HOURS PRN
Status: DISCONTINUED | OUTPATIENT
Start: 2021-07-08 | End: 2021-07-10 | Stop reason: HOSPADM

## 2021-07-08 RX ORDER — TRAMADOL HYDROCHLORIDE 50 MG/1
100 TABLET ORAL EVERY 6 HOURS PRN
Status: DISCONTINUED | OUTPATIENT
Start: 2021-07-08 | End: 2021-07-10 | Stop reason: HOSPADM

## 2021-07-08 RX ORDER — SENNA AND DOCUSATE SODIUM 50; 8.6 MG/1; MG/1
1 TABLET, FILM COATED ORAL 2 TIMES DAILY
Status: DISCONTINUED | OUTPATIENT
Start: 2021-07-08 | End: 2021-07-10 | Stop reason: HOSPADM

## 2021-07-08 RX ORDER — HYDRALAZINE HYDROCHLORIDE 20 MG/ML
5 INJECTION INTRAMUSCULAR; INTRAVENOUS EVERY 10 MIN PRN
Status: DISCONTINUED | OUTPATIENT
Start: 2021-07-08 | End: 2021-07-08 | Stop reason: HOSPADM

## 2021-07-08 RX ORDER — DIPHENHYDRAMINE HYDROCHLORIDE 50 MG/ML
12.5 INJECTION INTRAMUSCULAR; INTRAVENOUS
Status: DISCONTINUED | OUTPATIENT
Start: 2021-07-08 | End: 2021-07-08 | Stop reason: HOSPADM

## 2021-07-08 RX ORDER — DEXTROSE MONOHYDRATE 25 G/50ML
12.5 INJECTION, SOLUTION INTRAVENOUS PRN
Status: DISCONTINUED | OUTPATIENT
Start: 2021-07-08 | End: 2021-07-10 | Stop reason: HOSPADM

## 2021-07-08 RX ORDER — ONDANSETRON 2 MG/ML
INJECTION INTRAMUSCULAR; INTRAVENOUS PRN
Status: DISCONTINUED | OUTPATIENT
Start: 2021-07-08 | End: 2021-07-08 | Stop reason: SDUPTHER

## 2021-07-08 RX ORDER — PROPOFOL 10 MG/ML
INJECTION, EMULSION INTRAVENOUS PRN
Status: DISCONTINUED | OUTPATIENT
Start: 2021-07-08 | End: 2021-07-08 | Stop reason: SDUPTHER

## 2021-07-08 RX ORDER — FENTANYL CITRATE 50 UG/ML
INJECTION, SOLUTION INTRAMUSCULAR; INTRAVENOUS PRN
Status: DISCONTINUED | OUTPATIENT
Start: 2021-07-08 | End: 2021-07-08 | Stop reason: SDUPTHER

## 2021-07-08 RX ORDER — MAGNESIUM HYDROXIDE/ALUMINUM HYDROXICE/SIMETHICONE 120; 1200; 1200 MG/30ML; MG/30ML; MG/30ML
15 SUSPENSION ORAL EVERY 6 HOURS PRN
Status: DISCONTINUED | OUTPATIENT
Start: 2021-07-08 | End: 2021-07-10 | Stop reason: HOSPADM

## 2021-07-08 RX ORDER — PROMETHAZINE HYDROCHLORIDE 12.5 MG/1
12.5 TABLET ORAL EVERY 6 HOURS PRN
Status: DISCONTINUED | OUTPATIENT
Start: 2021-07-08 | End: 2021-07-10 | Stop reason: HOSPADM

## 2021-07-08 RX ORDER — BISACODYL 10 MG
10 SUPPOSITORY, RECTAL RECTAL DAILY PRN
Status: DISCONTINUED | OUTPATIENT
Start: 2021-07-08 | End: 2021-07-10 | Stop reason: HOSPADM

## 2021-07-08 RX ORDER — HYDROCODONE BITARTRATE AND ACETAMINOPHEN 5; 325 MG/1; MG/1
1 TABLET ORAL
Status: DISCONTINUED | OUTPATIENT
Start: 2021-07-08 | End: 2021-07-08 | Stop reason: HOSPADM

## 2021-07-08 RX ORDER — 0.9 % SODIUM CHLORIDE 0.9 %
500 INTRAVENOUS SOLUTION INTRAVENOUS
Status: DISCONTINUED | OUTPATIENT
Start: 2021-07-08 | End: 2021-07-08 | Stop reason: HOSPADM

## 2021-07-08 RX ORDER — SODIUM CHLORIDE 0.9 % (FLUSH) 0.9 %
10 SYRINGE (ML) INJECTION PRN
Status: DISCONTINUED | OUTPATIENT
Start: 2021-07-08 | End: 2021-07-10 | Stop reason: HOSPADM

## 2021-07-08 RX ORDER — PANTOPRAZOLE SODIUM 40 MG/1
40 TABLET, DELAYED RELEASE ORAL
Status: DISCONTINUED | OUTPATIENT
Start: 2021-07-09 | End: 2021-07-10 | Stop reason: HOSPADM

## 2021-07-08 RX ORDER — BUPIVACAINE HYDROCHLORIDE AND EPINEPHRINE 5; 5 MG/ML; UG/ML
INJECTION, SOLUTION EPIDURAL; INTRACAUDAL; PERINEURAL PRN
Status: DISCONTINUED | OUTPATIENT
Start: 2021-07-08 | End: 2021-07-08 | Stop reason: HOSPADM

## 2021-07-08 RX ORDER — ONDANSETRON 2 MG/ML
4 INJECTION INTRAMUSCULAR; INTRAVENOUS EVERY 6 HOURS PRN
Status: DISCONTINUED | OUTPATIENT
Start: 2021-07-08 | End: 2021-07-10 | Stop reason: HOSPADM

## 2021-07-08 RX ORDER — NICOTINE POLACRILEX 4 MG
15 LOZENGE BUCCAL PRN
Status: DISCONTINUED | OUTPATIENT
Start: 2021-07-08 | End: 2021-07-10 | Stop reason: HOSPADM

## 2021-07-08 RX ADMIN — FENTANYL CITRATE 100 MCG: 50 INJECTION, SOLUTION INTRAMUSCULAR; INTRAVENOUS at 11:15

## 2021-07-08 RX ADMIN — PROPOFOL 50 MG: 10 INJECTION, EMULSION INTRAVENOUS at 11:19

## 2021-07-08 RX ADMIN — PHENYLEPHRINE HYDROCHLORIDE 80 MCG: 10 INJECTION, SOLUTION INTRAMUSCULAR; INTRAVENOUS; SUBCUTANEOUS at 12:36

## 2021-07-08 RX ADMIN — DOCUSATE SODIUM 50 MG AND SENNOSIDES 8.6 MG 1 TABLET: 8.6; 5 TABLET, FILM COATED ORAL at 20:21

## 2021-07-08 RX ADMIN — LEVETIRACETAM 500 MG: 100 INJECTION, SOLUTION INTRAVENOUS at 08:47

## 2021-07-08 RX ADMIN — DEXAMETHASONE SODIUM PHOSPHATE 4 MG: 4 INJECTION, SOLUTION INTRAMUSCULAR; INTRAVENOUS at 22:59

## 2021-07-08 RX ADMIN — FENTANYL CITRATE 100 MCG: 50 INJECTION, SOLUTION INTRAMUSCULAR; INTRAVENOUS at 13:17

## 2021-07-08 RX ADMIN — FAMOTIDINE 20 MG: 10 INJECTION, SOLUTION INTRAVENOUS at 11:14

## 2021-07-08 RX ADMIN — ONDANSETRON 4 MG: 2 INJECTION INTRAMUSCULAR; INTRAVENOUS at 11:12

## 2021-07-08 RX ADMIN — LEVETIRACETAM 500 MG: 100 INJECTION, SOLUTION INTRAVENOUS at 20:02

## 2021-07-08 RX ADMIN — SODIUM CHLORIDE, SODIUM LACTATE, POTASSIUM CHLORIDE, AND CALCIUM CHLORIDE: .6; .31; .03; .02 INJECTION, SOLUTION INTRAVENOUS at 13:13

## 2021-07-08 RX ADMIN — POLYETHYLENE GLYCOL 3350 17 G: 17 POWDER, FOR SOLUTION ORAL at 17:26

## 2021-07-08 RX ADMIN — SODIUM CHLORIDE, SODIUM LACTATE, POTASSIUM CHLORIDE, AND CALCIUM CHLORIDE: .6; .31; .03; .02 INJECTION, SOLUTION INTRAVENOUS at 11:03

## 2021-07-08 RX ADMIN — PHENYLEPHRINE HYDROCHLORIDE 50 MCG: 10 INJECTION, SOLUTION INTRAMUSCULAR; INTRAVENOUS; SUBCUTANEOUS at 12:05

## 2021-07-08 RX ADMIN — ROCURONIUM BROMIDE 70 MG: 10 INJECTION INTRAVENOUS at 11:15

## 2021-07-08 RX ADMIN — Medication 10 ML: at 08:48

## 2021-07-08 RX ADMIN — ROCURONIUM BROMIDE 10 MG: 10 INJECTION INTRAVENOUS at 12:36

## 2021-07-08 RX ADMIN — CEFAZOLIN 2000 MG: 10 INJECTION, POWDER, FOR SOLUTION INTRAVENOUS at 20:21

## 2021-07-08 RX ADMIN — SODIUM CHLORIDE, SODIUM LACTATE, POTASSIUM CHLORIDE, AND CALCIUM CHLORIDE: .6; .31; .03; .02 INJECTION, SOLUTION INTRAVENOUS at 12:31

## 2021-07-08 RX ADMIN — LEVETIRACETAM 500 MG: 100 INJECTION, SOLUTION INTRAVENOUS at 11:37

## 2021-07-08 RX ADMIN — ROCURONIUM BROMIDE 20 MG: 10 INJECTION INTRAVENOUS at 12:30

## 2021-07-08 RX ADMIN — DEXAMETHASONE SODIUM PHOSPHATE 4 MG: 4 INJECTION, SOLUTION INTRAMUSCULAR; INTRAVENOUS at 05:47

## 2021-07-08 RX ADMIN — SODIUM CHLORIDE: 9 INJECTION, SOLUTION INTRAVENOUS at 17:26

## 2021-07-08 RX ADMIN — SUGAMMADEX 200 MG: 100 INJECTION, SOLUTION INTRAVENOUS at 13:11

## 2021-07-08 RX ADMIN — PHENYLEPHRINE HYDROCHLORIDE 100 MCG: 10 INJECTION, SOLUTION INTRAMUSCULAR; INTRAVENOUS; SUBCUTANEOUS at 12:28

## 2021-07-08 RX ADMIN — LIDOCAINE HYDROCHLORIDE 100 MG: 10 INJECTION, SOLUTION EPIDURAL; INFILTRATION; INTRACAUDAL; PERINEURAL at 11:15

## 2021-07-08 RX ADMIN — DEXAMETHASONE SODIUM PHOSPHATE 10 MG: 4 INJECTION, SOLUTION INTRAMUSCULAR; INTRAVENOUS at 11:17

## 2021-07-08 RX ADMIN — CEFAZOLIN SODIUM 2000 MG: 1 POWDER, FOR SOLUTION INTRAMUSCULAR; INTRAVENOUS at 11:41

## 2021-07-08 RX ADMIN — DEXAMETHASONE SODIUM PHOSPHATE 4 MG: 4 INJECTION, SOLUTION INTRAMUSCULAR; INTRAVENOUS at 17:25

## 2021-07-08 RX ADMIN — LEVOTHYROXINE SODIUM 25 MCG: 0.03 TABLET ORAL at 05:46

## 2021-07-08 RX ADMIN — PROPOFOL 70 MG: 10 INJECTION, EMULSION INTRAVENOUS at 13:11

## 2021-07-08 RX ADMIN — PROPOFOL 200 MG: 10 INJECTION, EMULSION INTRAVENOUS at 11:15

## 2021-07-08 ASSESSMENT — PULMONARY FUNCTION TESTS
PIF_VALUE: 23
PIF_VALUE: 22
PIF_VALUE: 22
PIF_VALUE: 23
PIF_VALUE: 21
PIF_VALUE: 23
PIF_VALUE: 21
PIF_VALUE: 19
PIF_VALUE: 22
PIF_VALUE: 13
PIF_VALUE: 22
PIF_VALUE: 22
PIF_VALUE: 21
PIF_VALUE: 21
PIF_VALUE: 23
PIF_VALUE: 22
PIF_VALUE: 21
PIF_VALUE: 21
PIF_VALUE: 23
PIF_VALUE: 22
PIF_VALUE: 19
PIF_VALUE: 18
PIF_VALUE: 22
PIF_VALUE: 23
PIF_VALUE: 22
PIF_VALUE: 22
PIF_VALUE: 19
PIF_VALUE: 23
PIF_VALUE: 22
PIF_VALUE: 22
PIF_VALUE: 21
PIF_VALUE: 22
PIF_VALUE: 23
PIF_VALUE: 22
PIF_VALUE: 23
PIF_VALUE: 22
PIF_VALUE: 6
PIF_VALUE: 21
PIF_VALUE: 22
PIF_VALUE: 21
PIF_VALUE: 22
PIF_VALUE: 21
PIF_VALUE: 10
PIF_VALUE: 22
PIF_VALUE: 21
PIF_VALUE: 22
PIF_VALUE: 22
PIF_VALUE: 23
PIF_VALUE: 22
PIF_VALUE: 21
PIF_VALUE: 22
PIF_VALUE: 23
PIF_VALUE: 22
PIF_VALUE: 14
PIF_VALUE: 19
PIF_VALUE: 20
PIF_VALUE: 22
PIF_VALUE: 23
PIF_VALUE: 22
PIF_VALUE: 22
PIF_VALUE: 23
PIF_VALUE: 23
PIF_VALUE: 22
PIF_VALUE: 21
PIF_VALUE: 22
PIF_VALUE: 20
PIF_VALUE: 22
PIF_VALUE: 23
PIF_VALUE: 22
PIF_VALUE: 22
PIF_VALUE: 23
PIF_VALUE: 22
PIF_VALUE: 22
PIF_VALUE: 23
PIF_VALUE: 22
PIF_VALUE: 23
PIF_VALUE: 20
PIF_VALUE: 22
PIF_VALUE: 20
PIF_VALUE: 22
PIF_VALUE: 23
PIF_VALUE: 22
PIF_VALUE: 23
PIF_VALUE: 22
PIF_VALUE: 23

## 2021-07-08 ASSESSMENT — PAIN SCALES - GENERAL
PAINLEVEL_OUTOF10: 0

## 2021-07-08 ASSESSMENT — ENCOUNTER SYMPTOMS
EYES NEGATIVE: 1
GASTROINTESTINAL NEGATIVE: 1
RESPIRATORY NEGATIVE: 1

## 2021-07-08 ASSESSMENT — LIFESTYLE VARIABLES: SMOKING_STATUS: 0

## 2021-07-08 NOTE — PROGRESS NOTES
PACU Transfer Note    Vitals:    07/08/21 1515   BP: 127/74   Pulse: 71   Resp: 14   Temp: 96.7 °F (35.9 °C)   SpO2: 97       In: -   Out: 200 [Urine:200]    Pain assessment:   Pain Level: 0    Report given to Receiving unit RN.    7/8/2021 3:26 PM

## 2021-07-08 NOTE — PROGRESS NOTES
Patient back from surgery. Oriented to self, place, and situation; disoriented to time. Vital signs stable. Denies having any pain. Surgical site to upper left side of scalp remains covered with dry dressing, CDI. R side remains weaker than the L side. Per MD order, head of bed to be at least 30 degrees and patient made aware. Daughter at bedside and brought lunch. Patient resting in bed with all needs met. Will continue to monitor.

## 2021-07-08 NOTE — PROGRESS NOTES
Patient down to OR for biopsy of left frontal lesion. Consent form signed by patient and neurosurgery NP. Daughter at bedside and updated on plan of care.

## 2021-07-08 NOTE — PLAN OF CARE
Problem: Falls - Risk of:  Goal: Will remain free from falls  Description: Will remain free from falls  7/8/2021 1000 by Almaz Shukla RN  Outcome: Ongoing  Note: Patient will remain free from falls throughout shift. Ambulating x1 SBA. Fall precautions in place. Non-skid socks on. Bed locked in lowest position with alarm on and 2/4 side rails up. Bedside table, belongings, and call light within reach. Patient calling out appropriately when needing assistance. Hourly rounding in anticipation of patient needs. Floor clean and free from clutter. Room door open. Will continue to monitor. Problem: Pain:  Goal: Control of acute pain  Description: Control of acute pain  Outcome: Ongoing  Note: Patient denies having any pain at this time. Using numerical pain rating scale appropriately. Able to reposition self in bed for comfort independently. Notified to let RN know if pain manifests. Will continue to monitor.

## 2021-07-08 NOTE — PROGRESS NOTES
Occupational Therapy  Discharge    Order received, chart reviewed. Per discussion w/ nurse, pt going to OR today for biopsy of left frontal lesion. Will sign off at this time and await new orders postop if indicated.     Petty Ramírez OTR/L #[id number]

## 2021-07-08 NOTE — PROGRESS NOTES
Pt admitted to PACU s/p CRANIOTOMY STEREOTATIC BIOPSY LEFT FRONTAL- awake and responsive- PO 96% on 3 L NC- L head scalp inc CDI w telfa lightly on top- no c/o pain

## 2021-07-08 NOTE — ANESTHESIA PRE PROCEDURE
Department of Anesthesiology  Preprocedure Note       Name:  Jonny Limon   Age:  78 y.o.  :  1950                                          MRN:  [id number]         Date:  2021      Surgeon: Fermin Stephenson):  Bekah Melo MD    Procedure: Procedure(s):  CRANIOTOMY STEREOTATIC BIOPSY LEFT FRONTAL    Medications prior to admission:   Prior to Admission medications    Medication Sig Start Date End Date Taking?  Authorizing Provider   amLODIPine (NORVASC) 10 MG tablet Take 10 mg by mouth daily   Yes Historical Provider, MD   losartan (COZAAR) 100 MG tablet Take 100 mg by mouth daily   Yes Historical Provider, MD   levothyroxine (SYNTHROID) 25 MCG tablet Take 25 mcg by mouth Daily   Yes Historical Provider, MD   MULTIPLE VITAMIN PO Take 1 tablet by mouth daily    Yes Historical Provider, MD   predniSONE (DELTASONE) 50 MG tablet Take 1 tablet by mouth daily for 5 days 21  Sonam Figeuroa MD       Current medications:    Current Facility-Administered Medications   Medication Dose Route Frequency Provider Last Rate Last Admin    meperidine (DEMEROL) injection 12.5 mg  12.5 mg Intravenous Q5 Min PRN Cate Marts, DO        HYDROmorphone (DILAUDID) injection 0.25 mg  0.25 mg Intravenous Q5 Min PRN Cate Marts, DO        HYDROmorphone (DILAUDID) injection 0.5 mg  0.5 mg Intravenous Q5 Min PRN Cate Marts, DO        HYDROcodone-acetaminophen [facility]) 5-325 MG per tablet 1 tablet  1 tablet Oral Once PRN Cate Marts, DO        ondansetron [facility]) injection 4 mg  4 mg Intravenous Once PRN Cate Marts, DO        prochlorperazine (COMPAZINE) injection 5 mg  5 mg Intravenous Once PRN Cate Marts, DO        0.9 % sodium chloride bolus  500 mL Intravenous Once PRN Cate Marts, DO        diphenhydrAMINE (BENADRYL) injection 12.5 mg  12.5 mg Intravenous Once PRN Cate Marts, DO        hydrALAZINE (APRESOLINE) injection 5 mg  5 mg Intravenous Q10 Min PRN [place] Sarah [place]one [place],         amLODIPine (NORVASC) tablet 10 mg  10 mg Oral Daily Mike Kapadia MD   10 mg at 07/07/21 0901    levothyroxine (SYNTHROID) tablet 25 mcg  25 mcg Oral Daily Mike Kapadia MD   25 mcg at 07/08/21 0546    losartan (COZAAR) tablet 100 mg  100 mg Oral Daily Mike Kapadia MD   100 mg at 07/07/21 0900    sodium chloride flush 0.9 % injection 5-40 mL  5-40 mL Intravenous 2 times per day Mike Kapadia MD   10 mL at 07/08/21 0848    sodium chloride flush 0.9 % injection 5-40 mL  5-40 mL Intravenous PRN Mike Kapadia MD        0.9 % sodium chloride infusion  25 mL Intravenous PRN Mike Kapadia MD        ondansetron (ZOFRAN-ODT) disintegrating tablet 4 mg  4 mg Oral Q8H PRN Mike Kapadia MD        Or    ondansetron (ZOFRAN) injection 4 mg  4 mg Intravenous Q6H PRN Mike Kapadia MD        polyethylene glycol (GLYCOLAX) packet 17 g  17 g Oral Daily PRN Mike Kapadia MD        acetaminophen (TYLENOL) tablet 650 mg  650 mg Oral Q6H PRN Mike Kapadia MD        Or    acetaminophen (TYLENOL) suppository 650 mg  650 mg Rectal Q6H PRN Mike Kapadia MD        levETIRAcetam (KEPPRA) 500 mg in sodium chloride 0.9 % 100 mL IVPB  500 mg Intravenous Q12H BLAYNE Brewster NP   Stopped at 07/08/21 0902    dexamethasone (DECADRON) injection 4 mg  4 mg Intravenous Q6H BLAYNE Chacon CNP   4 mg at 07/08/21 0547       Allergies:  No Known Allergies    Problem List:    Patient Active Problem List   Diagnosis Code    SOB (shortness of breath) R06.02    Asthma J45.909    Sleep apnea G47.30    Chronic cough R05    Brain mass G93.89       Past Medical History:        Diagnosis Date    [facility] cancer ([facility] [address]     High blood pressure        Past Surgical History:        Procedure Laterality Date    BREAST BIOPSY      COLON SURGERY      TUBAL LIGATION         Social History:    Social History     Tobacco Use    Smoking status: Former Smoker     Packs/day: 0.25     Years: 3.00     Pack years: 0.75     Types: Cigarettes     Quit date: 1986     Years since quittin.9    Smokeless tobacco: Never Used   Substance Use Topics    Alcohol use: No                                Counseling given: Not Answered      Vital Signs (Current):   Vitals:    21 1932 21 2241 21 0315 21 0700   BP: 110/68 116/69 102/63 118/62   Pulse: 68 61 64 67   Resp: 13 16 16 16   Temp: 97.8 °F (36.6 °C) 98.1 °F (36.7 °C) 97.8 °F (36.6 °C) 98.3 °F (36.8 °C)   TempSrc: Oral Oral Axillary Oral   SpO2: 96% 95% 92% 93%   Weight:       Height:                                                  BP Readings from Last 3 Encounters:   21 118/62   21 109/65   21 133/61       NPO Status: Time of last liquid consumption:                         Time of last solid consumption: 2300                                                      BMI:   Wt Readings from Last 3 Encounters:   21 197 lb (89.4 kg)   21 197 lb 15.6 oz (89.8 kg)   21 198 lb 6.6 oz (90 kg)     Body mass index is 34.9 kg/m². CBC:   Lab Results   Component Value Date    WBC 9.6 2021    RBC 4.56 2021    HGB 12.6 2021    HCT 37.3 2021    MCV 81.8 2021    RDW 14.6 2021     2021       CMP:   Lab Results   Component Value Date     2021    K 4.0 2021     2021    CO2 26 2021    BUN 9 2021    CREATININE 0.8 2021    GFRAA >60 2021    AGRATIO 1.4 2020    LABGLOM >60 2021    GLUCOSE 160 2021    PROT 8.3 2020    CALCIUM 8.7 2021    BILITOT 0.3 2020    ALKPHOS 94 2020    AST 22 2020    ALT 16 2020       POC Tests: No results for input(s): POCGLU, POCNA, POCK, POCCL, POCBUN, POCHEMO, POCHCT in the last 72 hours.     Coags:   Lab Results   Component Value Date    PROTIME 11.3 2021 INR 1.00 07/08/2021    APTT 29.1 07/08/2021       HCG (If Applicable): No results found for: PREGTESTUR, PREGSERUM, HCG, HCGQUANT     ABGs: No results found for: PHART, PO2ART, VIY6KIL, KMM5PPR, BEART, C9WXDBFG     Type & Screen (If Applicable):  No results found for: LABABO, LABRH    Drug/Infectious Status (If Applicable):  Lab Results   Component Value Date    HEPCAB Non-Reactive (Negative) 09/23/2011       COVID-19 Screening (If Applicable): No results found for: COVID19        Anesthesia Evaluation  Patient summary reviewed and Nursing notes reviewed no history of anesthetic complications:   Airway: Mallampati: II  TM distance: >3 FB   Neck ROM: full  Mouth opening: > = 3 FB Dental:    (+) upper dentures and lower dentures      Pulmonary: breath sounds clear to auscultation  (+) sleep apnea: on CPAP,  asthma: seasonal asthma,     (-) not a current smoker (never)                           Cardiovascular:  Exercise tolerance: good (>4 METS),   (+) hypertension: moderate,     (-) past MI    NYHA Classification: II    Rhythm: regular  Rate: normal           Beta Blocker:  Not on Beta Blocker         Neuro/Psych:                ROS comment: New onset balance/ right arm/leg weakness  No sz hx w/u revealed left frontal brain mass  GI/Hepatic/Renal:        (-) GERD       Endo/Other:    (+) malignancy/cancer (colon ca 2010  had chemo   ). Abdominal:   (+) obese,           Vascular: negative vascular ROS. Other Findings:             Anesthesia Plan      general     ASA 4 - emergent       Induction: intravenous. MIPS: Postoperative opioids intended and Prophylactic antiemetics administered. Anesthetic plan and risks discussed with patient. Plan discussed with CRNA.     Attending anesthesiologist reviewed and agrees with Preprocedure content              Cristina Bravo DO   7/8/2021

## 2021-07-08 NOTE — CONSULTS
[facility]  Consult Note      Requesting Physician: Magdalena Robles MD    CHIEF COMPLAINT:  R sided weakness    HISTORY OF PRESENT ILLNESS:  Ms. Rupert Omer  is a 78 y.o. female we are seeing in consultation for radiographic abnormalities concerning for a primary CNS malignancy. She presented to the emergency room with persistent right-sided weakness. She states that her symptoms began on July 3. She underwent imaging with a head CT in the ER that showed a brain mass. She has been admitted for further evaluation. Since admission, she has been treated with steroids which have improved her right-sided weakness. Medical oncology is consulted for further recommendations. It is of note that she has a remote history of colon cancer. She is a bit unsure of the details but describes having undergone a surgery and chemotherapy treatment. She has not seen an oncologist in many years.     Past Medical History:        Diagnosis Date    Colon cancer ([address]     High blood pressure        Past Surgical History:        Procedure Laterality Date    BREAST BIOPSY      COLON SURGERY      TUBAL LIGATION         Current Medications:  Current Facility-Administered Medications: amLODIPine (NORVASC) tablet 10 mg, 10 mg, Oral, Daily  levothyroxine (SYNTHROID) tablet 25 mcg, 25 mcg, Oral, Daily  losartan (COZAAR) tablet 100 mg, 100 mg, Oral, Daily  sodium chloride flush 0.9 % injection 5-40 mL, 5-40 mL, Intravenous, 2 times per day  sodium chloride flush 0.9 % injection 5-40 mL, 5-40 mL, Intravenous, PRN  0.9 % sodium chloride infusion, 25 mL, Intravenous, PRN  ondansetron (ZOFRAN-ODT) disintegrating tablet 4 mg, 4 mg, Oral, Q8H PRN **OR** ondansetron (ZOFRAN) injection 4 mg, 4 mg, Intravenous, Q6H PRN  polyethylene glycol (GLYCOLAX) packet 17 g, 17 g, Oral, Daily PRN  acetaminophen (TYLENOL) tablet 650 mg, 650 mg, Oral, Q6H PRN **OR** acetaminophen (TYLENOL) suppository 650 mg, 650 mg, Rectal, Q6H PRN  levETIRAcetam (KEPPRA) 500 mg in sodium chloride 0.9 % 100 mL IVPB, 500 mg, Intravenous, Q12H  dexamethasone (DECADRON) injection 4 mg, 4 mg, Intravenous, Q6H    Allergies:  Patient has no known allergies. Social History:  Social History     Socioeconomic History    Marital status:      Spouse name: Not on file    Number of children: Not on file    Years of education: Not on file    Highest education level: Not on file   Occupational History    Not on file   Tobacco Use    Smoking status: Former Smoker     Packs/day: 0.25     Years: 3.00     Pack years: 0.75     Types: Cigarettes     Quit date: 1986     Years since quittin.9    Smokeless tobacco: Never Used   Vaping Use    Vaping Use: Never used   Substance and Sexual Activity    Alcohol use: No    Drug use: No    Sexual activity: Not on file   Other Topics Concern    Not on file   Social History Narrative    Not on file     Social Determinants of Health     Financial Resource Strain:     Difficulty of Paying Living Expenses:    Food Insecurity:     Worried About Running Out of Food in the Last Year:     [address] in the Last Year:    Transportation Needs:     Lack of Transportation (Medical):      Lack of Transportation (Non-Medical):    Physical Activity:     Days of Exercise per Week:     Minutes of Exercise per Session:    Stress:     Feeling of Stress :    Social Connections:     Frequency of Communication with Friends and Family:     Frequency of Social Gatherings with Friends and Family:     Attends [employer] Services:     Active Member of Clubs or Organizations:     Attends Club or Organization Meetings:     Marital Status:    Intimate Partner Violence:     Fear of Current or Ex-Partner:     Emotionally Abused:     Physically Abused:     Sexually Abused:        Family History:         Problem Relation Age of Onset    No Known Problems Other         lung disease        REVIEW OF SYSTEMS:    Review of Systems Constitutional: Negative. Negative for fever. HENT: Negative. Eyes: Negative. Respiratory: Negative. Cardiovascular: Negative. Gastrointestinal: Negative. Endocrine: Negative. Genitourinary: Negative. Musculoskeletal: Negative. Skin: Negative. Neurological: Positive for weakness (Right sided, improved). Negative for seizures and headaches. Hematological: Negative. PHYSICAL EXAM:      Vitals:  /63   Pulse 64   Temp 97.8 °F (36.6 °C) (Axillary)   Resp 16   Ht 5' 3\" (1.6 m)   Wt 197 lb (89.4 kg)   SpO2 92%   BMI 34.90 kg/m²       Intake/Output Summary (Last 24 hours) at 7/8/2021 0657  Last data filed at 7/7/2021 2241  Gross per 24 hour   Intake 780 ml   Output --   Net 780 ml       Physical Exam  HENT:      Mouth/Throat:      Comments: Edentulous  Eyes:      General: No scleral icterus. Cardiovascular:      Rate and Rhythm: Normal rate and regular rhythm. Heart sounds: No murmur heard. No gallop. Pulmonary:      Effort: Pulmonary effort is normal.      Breath sounds: Normal breath sounds. No wheezing, rhonchi or rales. Abdominal:      Palpations: Abdomen is soft. Tenderness: There is no abdominal tenderness. Musculoskeletal:         General: No swelling. Lymphadenopathy:      Cervical: No cervical adenopathy. Skin:     General: Skin is warm and dry. Findings: No rash. Neurological:      General: No focal deficit present. Mental Status: She is alert and oriented to person, place, and time.        DATA:  Recent Labs     07/08/21  0608 07/06/21  1725 07/04/21  1622   WBC 9.6 13.0* 9.6   NEUTROABS 8.4* 7.4 5.8   LYMPHOPCT 11.6 33.2 28.0   RBC 4.56 4.32 4.45   HGB 12.6 11.8* 12.0   HCT 37.3 35.1* 36.0   MCV 81.8 81.2 80.8   MCH 27.7 27.3 27.0   MCHC 33.8 33.6 33.4   RDW 14.6 14.8 14.7    321 285       Lab Results   Component Value Date     07/08/2021    K 4.0 07/08/2021     07/08/2021    CO2 26 07/08/2021    GLUCOSE 160 (H) 07/08/2021    BUN 9 07/08/2021    CREATININE 0.8 07/08/2021    LABGLOM >60 07/08/2021    GFRAA >60 07/08/2021    CALCIUM 8.7 07/08/2021    PROT 8.3 (H) 06/11/2020    LABALBU 4.8 06/11/2020    AGRATIO 1.4 06/11/2020    BILITOT 0.3 06/11/2020    ALKPHOS 94 06/11/2020    ALT 16 06/11/2020    AST 22 06/11/2020    GLOB 3.5 06/11/2020       Lab Results   Component Value Date    PROT 8.3 (H) 06/11/2020    PROT 7.5 09/01/2017    INR 0.96 07/08/2021    INR 1.05 07/04/2021     Lab Results   Component Value Date    APTT 29.1 07/08/2021       Radiology Review:  XR SHOULDER RIGHT (MIN 2 VIEWS)    Result Date: 7/4/2021  EXAMINATION: THREE XRAY VIEWS OF THE RIGHT SHOULDER 7/4/2021 4:37 pm COMPARISON: September 2017 HISTORY: ORDERING SYSTEM PROVIDED HISTORY: R shoulder pain after fall TECHNOLOGIST PROVIDED HISTORY: Reason for exam:->R shoulder pain after fall Reason for Exam: right shoulder pain from fall Acuity: Acute Type of Exam: Initial FINDINGS: Alignment at the [facility] joint and glenohumeral joint appears normal.  Moderate spurring is seen at the [facility] joint. Left-sided port is seen No focal consolidation in the lungs. Spurring is seen in the spine     Degenerative change. No acute osseous abnormality     CT HEAD WO CONTRAST    Result Date: 7/4/2021  EXAMINATION: CT OF THE HEAD WITHOUT CONTRAST  7/4/2021 5:04 pm TECHNIQUE: CT of the head was performed without the administration of intravenous contrast. Dose modulation, iterative reconstruction, and/or weight based adjustment of the mA/kV was utilized to reduce the radiation dose to as low as reasonably achievable. COMPARISON: None. HISTORY: ORDERING SYSTEM PROVIDED HISTORY: RUE and RLE numbness, R hand clumsiness and decreased hand strength TECHNOLOGIST PROVIDED HISTORY: Has a \"code stroke\" or \"stroke alert\" been called? ->No Reason for exam:->RUE and RLE numbness, R hand clumsiness and decreased hand strength Decision Support Exception - unselect if not a suspected or confirmed emergency medical condition->Emergency Medical Condition (MA) Reason for Exam: RUE and RLE numbness, R hand clumsiness and decreased hand strength Acuity: Acute Type of Exam: Initial FINDINGS: BRAIN/VENTRICLES: Ventricles are midline in position. There is a heterogeneous area of hypodensity in the posterior left parietal lobe with a suspected nodule measuring 2.0 x 1.2 cm cm. There is surrounding vasogenic edema. Mild periventricular hypodensity is seen. Scattered additional areas of hypodensity are seen throughout the frontal parietal white matter. There is intracranial atherosclerosis. . ORBITS: The visualized portion of the orbits demonstrate no acute abnormality. SINUSES: No significant mastoid opacification noted. No air-fluid levels noted. Spurring is seen at the tip of the dens. SOFT TISSUES/SKULL:  No acute abnormality of the visualized skull or soft tissues. Focal intra-axial nodule or mass in the posterior left parietal lobe, suspicious for intracranial metastasis, in the absence of clinical signs of infection which would suggest that this could represent an abscess. . Correlate for any history of known primary. Mild periventricular small vessel ischemic change, with mild atrophy. XR CHEST PORTABLE    Result Date: 7/4/2021  EXAMINATION: ONE XRAY VIEW OF THE CHEST 7/4/2021 4:37 pm COMPARISON: 06/11/2020 HISTORY: ORDERING SYSTEM PROVIDED HISTORY: RUE and RLE numbness, R hand clumsiness and decreased hand strength TECHNOLOGIST PROVIDED HISTORY: Reason for exam:->RUE and RLE numbness, R hand clumsiness and decreased hand strength Reason for Exam: numbness Acuity: Acute Type of Exam: Initial FINDINGS: Tip MediPort projects at the cavoatrial junction. Heart size is stable. Mediastinal contours are stable. Pulmonary vascularity is stable. No focal lung consolidation is noted. .  Patient body habitus limits evaluation of fine pulmonary parenchymal changes.  Degenerative changes are seen in CIRCULATION: Bilateral fetal origins no significant stenosis of the vertebral, basilar, or posterior cerebral arteries. No aneurysm. OTHER: No dural venous sinus thrombosis on this non-dedicated study. BRAIN: Subcortical hypoattenuation involving left parietal lobe just deep to the central sulcus noted with area of ring enhancement noted. There is a focus of increased attenuation identified more inferiorly to this noted. No large vessel occlusion or hemodynamic stenosis involving intracranial cervical vessels. Left parietal abnormalities with ring enhancement. Consider MRI further characterization without and with contrast.     CT CHEST ABDOMEN PELVIS W CONTRAST    Result Date: 7/7/2021  CT CHEST, ABDOMEN AND PELVIS: INDICATION: Brain mass. Assess for malignancy. . TECHNIQUE: Spiral CT images of the chest, abdomen and pelvis were obtained following the intravenous and oral administration of contrast. Patient received a total of 80 mL of Isovue-370 contrast. Up-to-date CT equipment and radiation dose reduction techniques were employed. COMPARISON: NONE. CHEST: No axillary, mediastinal or hilar lymphadenopathy is identified. No pericardial effusion is identified. Thoracic aorta is without aneurysm. No pulmonary mass is identified. No infiltrates are identified. Central airways are patent. Interstitial lung markings are normal.     1. No evidence of any thoracic metastatic disease. ABDOMEN: The liver, gallbladder, pancreas, spleen and adrenal glands are normal. Renal size and enhancement are normal. No abdominal lymphadenopathy is identified. PELVIS: No pelvic lymphadenopathy is identified. There is a fat-containing left inguinal hernia. No adnexal mass is identified. The patient is status post a prior sigmoidectomy. No bowel dilatation or bowel wall thickening is identified. No osteolytic or osteoblastic lesions are identified. IMPRESSION: 1. No evidence of any abdominal or pelvic metastatic disease.  2. Status post sigmoidectomy. MRI BRAIN W WO CONTRAST    Result Date: 7/7/2021  EXAM: MRI BRAIN W WO CONTRAST INDICATION: Brain mass, surgical treatment planning COMPARISON: Head -2542 TECHNIQUE: Multiplanar, multisequence MR imaging of the head obtained without and with contrast. IV contrast: ProHance 19 mL FINDINGS: MIDLINE STRUCTURES: The sella turcica and pituitary gland are normal. Craniovertebral alignment and cerebellar tonsils are normal. VENTRICLES: Normal size; no midline shift INTRACRANIAL HEMORRHAGE: None. BRAIN PARENCHYMA: At the posterior inferior left parietal lobe on axial postcontrast FLAIR series 11, images 26-31, there is infiltrative masslike FLAIR hyperintensity which involves cortical gray matter and white matter. The postcontrast T1 images demonstrate very subtle  nodular/curvilinear enhancement at this location difficult to accurately measure. There is a component of diffusion restriction involving a portion of the FLAIR hyperintensity (DWI series 4, image 66; ADC map series 5, image 16). At the parafalcine aspect of left frontal lobe centered along the junction of the left superior frontal gyrus and cingulate gyrus, there is a somewhat round hyperintense T2, hypointense T1 lesion lesion with enhancement measuring 13 x 14 x 15 mm (postcontrast axial series 10, image 145; coronal postcontrast image 90) involving cortex and subcortical white matter. The lesion demonstrates mild diffusion hyperintensity series 4, image 70. Centered in the posterior left frontal lobe centrum semiovale, there is a heterogeneous hyperintense T2, hypointense T1 lesion with predominantly thick, irregular ringlike enhancement 2.8 x 1.9 x 1.7 cm (axial postcontrast series 10, image 141; coronal postcontrast image 122).  The  superior margins of the enhancement extend superiorly along the juxtacortical aspect of the left precentral gyrus and posterior aspect of the superior frontal gyrus straddling the precentral sulcus (sagittal postcontrast image 58-71). The ring enhancement demonstrates diffusion restriction. There is a moderate zone of surrounding hyperintense T2/FLAIR signal involving the posterior left frontal white matter extending to the periventricular white matter and left posterior  body of corpus callosum (axial FLAIR image 32). Mass effect results in partial effacement of the left precentral sulcus on the central sulcus. There is a mild-to-moderate amount of additional periventricular and subcortical hyperintense T2/FLAIR signal most pronounced along the left frontal corona radiata. INTRACRANIAL VASCULATURE: Major intracranial vessels are grossly patent. ORBITS: Normal. BONE MARROW: Bone marrow signal within normal limits. VISUALIZED CERVICAL SPINE: Normal PARANASAL SINUSES/MASTOID BONES: No acute sinusitis or mastoiditis. EXTRACRANIAL SOFT TISSUES: Normal     1. 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 Therefore, metastatic disease is less likely. 2. Mild to moderate chronic small vessel ischemia         Problem List  Patient Active Problem List   Diagnosis    SOB (shortness of breath)    Asthma    Sleep apnea    Chronic cough    Brain mass         IMPRESSION/RECOMMENDATIONS:  Intracranial masses  - These have the radiographic appearance of being primary CNS malignancy such as GBM or astrocytoma. - Neurosurgery is planning biopsy  - Await pathology. - I suspect management will include a combination of radiation therapy with Temodar followed by Temodar.  - No evidence of disease in CAT scan of the chest abdomen or pelvis.   It is quite unlikely that the intracranial findings are related to her remote history of colon cancer.    - I will have her follow-up with our medical neuro oncologist, Dr. Danica Caldwell in the outpatient setting.  - She will also follow-up with radiation oncology. Discussed with patient. Thank you for the consultation.     Johana Garcia MD, PhD  7/8/2021  6:57 AM

## 2021-07-08 NOTE — OP NOTE
OPERATIVE NOTE       Patient Name: Juan Carlos Linton  MRN: [id number]  : 1950  DOS: 2021     PREOPERATIVE DIAGNOSIS: Multiple intracranial lesions     POSTOPERATIVE DIAGNOSIS: Multiple intracranial lesions    PROCEDURE PERFORMED:    1. Left frontal bur hole placement  2. Needle biopsy of left cingulate lesion   3. Use of Intraoperative neuronavigation and Varioguide arm      SURGEON: Eliana Merino MD, PhD     ASSISTANT: PARISA Baires    Ms. Karolyn Rodriguez served as assistant on this surgery due to the complex nature of the procedure and the lack of a resident or fellow assistant. She provided assistance with opening, obtaining the biopsy, and closing. ANESTHESIA: General endotracheal anesthesia. ESTIMATED BLOOD LOSS: 10 cc    DRAINS: None    IMPLANTS:   1. Synthes craniofacial plating system     SPECIMEN:   1. Left frontal mass     INDICATIONS:   Ms. Johana Cedeño is a 78year old woman with a history of colon cancer in  who presented with right hemiparesis. MRI demonstrated lesions in the posterior left frontal region as well as within the cingulate gyrus on the left. CT chest, abdomen, and pelvis was negative for a primary lesion. Given the appearance of the masses and the patient's symptoms, stereotactic biopsy was recommended. The patient understands the risks and benefits of the procedure including but not limited to bleeding, infection, worsened cognitive function, aphasia, vascular injury, cerebral spinal fluid leak, errant or insufficient biopsy, stroke, anesthesia risks, and death. OPERATIVE PROCEDURE:      Under universal protocol and informed consent, the patient was brought to the operating room. General anesthesia was induced and the patient was intubated. The patient was placed in the supine position and all pressure points were appropriately padded. The patient was then placed in [address] fixation with the head in neutral position.  Brainlab image guidance was registered and verified. A linear incision was planned with stereotaxy. Intravenous antibiotics (Ancef), Decadron, and Keppra were given by anesthesia. A time out was completed and the surgical site was prepped and draped in the usual sterile fashion. After infiltration of the skin with 0.5% Lidocaine with 1:100,000 Epinephrine, the cranial incision was made with a #15 scalpel. Using a combination of Bovie electrocautery and a #1 Penfield dissector, the frontal bone was exposed. Using a 4mm cutter ball bit on a zeeWAVES drill, a bur hole was drilled overlying the planned entry point for biopsy. After verification of the accuracy of the Brainlab navigation system, a cruciate durotomy was made with a #11 scalpel. The underlying abby was bipolared, and a tiny corticectomy was made with the scalpel at the area corresponding to the entry point of the biopsy needle. The Varioguide arm was then navigated into position, and the biopsy needle was then passed under stereotactic guidance into the target lesion of interest. Two samples were taken in quadrants and sent for frozen histopathologic analysis. Frozen sections were reported as abnormal and consistent with lesional tissue, most likely a glioma. Several additional samples were taken in quadrants and sent for permanent histopathology. Thrombin was then injected in quadrants and the needle was removed after 1 minute. There was no evidence of active bleeding or underlying brain swelling. Gelfoam was placed at the bur hole site. A Synthes Craniofacial bur hole cover was secured with self-tapping 4mm screws to cover the hole. The wound was again copiously irrigated with antibiotic solution and the incision was closed using 3-0 Vicryl sutures in the galeal layer. The skin was closed with a running 3-0 Ethilon suture. The wound was then dressed with antibiotic ointment, Telfa, and Medipore tape. All needle counts and sponge counts were correct.  The patient was taken out of [place] 3-pin fixation, extubated, and taken to PACU in stable condition. I affirm in accordance with CMS regulations that I was present and scrubbed for all critical portions of the case. COMPLICATIONS: None. DISPOSITION: The patient was transferred to the PACU in stable condition.

## 2021-07-08 NOTE — PROGRESS NOTES
Pt is alert and oriented to self, place and situation. VSS. Pt denies headache. Neuro status remains unchanged and pt reports rt arm weakness has improved. Pt denies any needs, all fall precautions in place.

## 2021-07-08 NOTE — PLAN OF CARE
Problem: Falls - Risk of:  Goal: Will remain free from falls  Outcome: Ongoing  Note: Patient is a high fall risk. All fall precautions in place: bed alarm on, nonskid socks on pt, call light within reach, bed locked in lowest position. Will continue to monitor pt safety. Problem: Pain:  Description: Pain management should include both nonpharmacologic and pharmacologic interventions. Goal: Pain level will decrease  Outcome: Ongoing  Note: Pt denies pain at this time, VSS, no objective signs of pain. Will continue to monitor.

## 2021-07-08 NOTE — PROGRESS NOTES
Patient is taken to OR for brain biopsy. She is still off the floor at this time. Will follow up on a patient in the morning.   Lucillie Simmonds, MD

## 2021-07-08 NOTE — ANESTHESIA POSTPROCEDURE EVALUATION
Department of Anesthesiology  Postprocedure Note    Patient: Lary Hutson  MRN: [id number]  YOB: 1950  Date of evaluation: 7/8/2021  Time:  6:20 PM     Procedure Summary     Date: 07/08/21 Room / Location: [facility] [address] [place] [address] / [facility]    Anesthesia Start: 1103 Anesthesia Stop: 3075    Procedure: CRANIOTOMY STEREOTATIC BIOPSY LEFT FRONTAL (Left Head) Diagnosis: (LEFT FRONTAL BRAIN MASS)    Surgeons: Orville Reynoso MD Responsible Provider: Jamie Smyth DO    Anesthesia Type: general ASA Status: 4 - Emergent          Anesthesia Type: general    Kylah Phase I: Kylah Score: 9    Kylah Phase II:      Last vitals: Reviewed and per EMR flowsheets.        Anesthesia Post Evaluation    Patient location during evaluation: PACU  Patient participation: complete - patient participated  Level of consciousness: awake  Pain score: 2  Airway patency: patent  Nausea & Vomiting: no nausea and no vomiting  Complications: no  Cardiovascular status: hemodynamically stable  Respiratory status: acceptable  Hydration status: euvolemic

## 2021-07-09 LAB
ANION GAP SERPL CALCULATED.3IONS-SCNC: 11 MMOL/L (ref 3–16)
BUN BLDV-MCNC: 16 MG/DL (ref 7–20)
CALCIUM SERPL-MCNC: 9.5 MG/DL (ref 8.3–10.6)
CHLORIDE BLD-SCNC: 103 MMOL/L (ref 99–110)
CO2: 25 MMOL/L (ref 21–32)
CREAT SERPL-MCNC: 0.8 MG/DL (ref 0.6–1.2)
GFR AFRICAN AMERICAN: >60
GFR NON-AFRICAN AMERICAN: >60
GLUCOSE BLD-MCNC: 120 MG/DL (ref 70–99)
GLUCOSE BLD-MCNC: 140 MG/DL (ref 70–99)
GLUCOSE BLD-MCNC: 143 MG/DL (ref 70–99)
GLUCOSE BLD-MCNC: 165 MG/DL (ref 70–99)
GLUCOSE BLD-MCNC: 169 MG/DL (ref 70–99)
HCT VFR BLD CALC: 33.3 % (ref 36–48)
HEMOGLOBIN: 11.2 G/DL (ref 12–16)
MCH RBC QN AUTO: 27.5 PG (ref 26–34)
MCHC RBC AUTO-ENTMCNC: 33.6 G/DL (ref 31–36)
MCV RBC AUTO: 81.8 FL (ref 80–100)
PDW BLD-RTO: 14.8 % (ref 12.4–15.4)
PERFORMED ON: ABNORMAL
PLATELET # BLD: 318 K/UL (ref 135–450)
PMV BLD AUTO: 9.2 FL (ref 5–10.5)
POTASSIUM SERPL-SCNC: 4.3 MMOL/L (ref 3.5–5.1)
RBC # BLD: 4.07 M/UL (ref 4–5.2)
SODIUM BLD-SCNC: 139 MMOL/L (ref 136–145)
WBC # BLD: 17.6 K/UL (ref 4–11)

## 2021-07-09 PROCEDURE — 97166 OT EVAL MOD COMPLEX 45 MIN: CPT

## 2021-07-09 PROCEDURE — 6360000002 HC RX W HCPCS: Performed by: NEUROLOGICAL SURGERY

## 2021-07-09 PROCEDURE — 97162 PT EVAL MOD COMPLEX 30 MIN: CPT

## 2021-07-09 PROCEDURE — 83036 HEMOGLOBIN GLYCOSYLATED A1C: CPT

## 2021-07-09 PROCEDURE — 6370000000 HC RX 637 (ALT 250 FOR IP): Performed by: NURSE PRACTITIONER

## 2021-07-09 PROCEDURE — 97535 SELF CARE MNGMENT TRAINING: CPT

## 2021-07-09 PROCEDURE — 97116 GAIT TRAINING THERAPY: CPT

## 2021-07-09 PROCEDURE — 85027 COMPLETE CBC AUTOMATED: CPT

## 2021-07-09 PROCEDURE — 2060000000 HC ICU INTERMEDIATE R&B

## 2021-07-09 PROCEDURE — 6360000002 HC RX W HCPCS: Performed by: NURSE PRACTITIONER

## 2021-07-09 PROCEDURE — 36415 COLL VENOUS BLD VENIPUNCTURE: CPT

## 2021-07-09 PROCEDURE — 2580000003 HC RX 258: Performed by: NEUROLOGICAL SURGERY

## 2021-07-09 PROCEDURE — 2580000003 HC RX 258: Performed by: NURSE PRACTITIONER

## 2021-07-09 PROCEDURE — 6370000000 HC RX 637 (ALT 250 FOR IP): Performed by: NEUROLOGICAL SURGERY

## 2021-07-09 PROCEDURE — 97530 THERAPEUTIC ACTIVITIES: CPT

## 2021-07-09 PROCEDURE — 6370000000 HC RX 637 (ALT 250 FOR IP): Performed by: INTERNAL MEDICINE

## 2021-07-09 PROCEDURE — 80048 BASIC METABOLIC PNL TOTAL CA: CPT

## 2021-07-09 RX ORDER — INSULIN LISPRO 100 [IU]/ML
0-12 INJECTION, SOLUTION INTRAVENOUS; SUBCUTANEOUS
Status: DISCONTINUED | OUTPATIENT
Start: 2021-07-09 | End: 2021-07-10 | Stop reason: HOSPADM

## 2021-07-09 RX ORDER — LEVETIRACETAM 500 MG/1
500 TABLET ORAL 2 TIMES DAILY
Status: DISCONTINUED | OUTPATIENT
Start: 2021-07-09 | End: 2021-07-10 | Stop reason: HOSPADM

## 2021-07-09 RX ORDER — DEXAMETHASONE 4 MG/1
4 TABLET ORAL EVERY 6 HOURS SCHEDULED
Status: DISCONTINUED | OUTPATIENT
Start: 2021-07-09 | End: 2021-07-10 | Stop reason: HOSPADM

## 2021-07-09 RX ORDER — INSULIN LISPRO 100 [IU]/ML
0-6 INJECTION, SOLUTION INTRAVENOUS; SUBCUTANEOUS NIGHTLY
Status: DISCONTINUED | OUTPATIENT
Start: 2021-07-09 | End: 2021-07-10 | Stop reason: HOSPADM

## 2021-07-09 RX ADMIN — DEXAMETHASONE SODIUM PHOSPHATE 4 MG: 4 INJECTION, SOLUTION INTRAMUSCULAR; INTRAVENOUS at 05:51

## 2021-07-09 RX ADMIN — POLYETHYLENE GLYCOL 3350 17 G: 17 POWDER, FOR SOLUTION ORAL at 09:03

## 2021-07-09 RX ADMIN — CEFAZOLIN SODIUM 2000 MG: 1 POWDER, FOR SOLUTION INTRAMUSCULAR; INTRAVENOUS at 12:41

## 2021-07-09 RX ADMIN — LEVETIRACETAM 500 MG: 100 INJECTION, SOLUTION INTRAVENOUS at 09:03

## 2021-07-09 RX ADMIN — DEXAMETHASONE 4 MG: 4 TABLET ORAL at 12:41

## 2021-07-09 RX ADMIN — DOCUSATE SODIUM 50 MG AND SENNOSIDES 8.6 MG 1 TABLET: 8.6; 5 TABLET, FILM COATED ORAL at 20:34

## 2021-07-09 RX ADMIN — LEVOTHYROXINE SODIUM 25 MCG: 0.03 TABLET ORAL at 05:51

## 2021-07-09 RX ADMIN — INSULIN LISPRO 1 UNITS: 100 INJECTION, SOLUTION INTRAVENOUS; SUBCUTANEOUS at 20:34

## 2021-07-09 RX ADMIN — DEXAMETHASONE 4 MG: 4 TABLET ORAL at 23:53

## 2021-07-09 RX ADMIN — HEPARIN SODIUM 5000 UNITS: 5000 INJECTION INTRAVENOUS; SUBCUTANEOUS at 20:34

## 2021-07-09 RX ADMIN — Medication 10 ML: at 09:04

## 2021-07-09 RX ADMIN — CEFAZOLIN 2000 MG: 10 INJECTION, POWDER, FOR SOLUTION INTRAVENOUS at 03:32

## 2021-07-09 RX ADMIN — CEFAZOLIN SODIUM 2000 MG: 1 POWDER, FOR SOLUTION INTRAMUSCULAR; INTRAVENOUS at 20:41

## 2021-07-09 RX ADMIN — DEXAMETHASONE 4 MG: 4 TABLET ORAL at 17:34

## 2021-07-09 RX ADMIN — AMLODIPINE BESYLATE 10 MG: 10 TABLET ORAL at 09:03

## 2021-07-09 RX ADMIN — LEVETIRACETAM 500 MG: 500 TABLET ORAL at 20:34

## 2021-07-09 RX ADMIN — PANTOPRAZOLE SODIUM 40 MG: 40 TABLET, DELAYED RELEASE ORAL at 05:51

## 2021-07-09 RX ADMIN — INSULIN LISPRO 2 UNITS: 100 INJECTION, SOLUTION INTRAVENOUS; SUBCUTANEOUS at 17:44

## 2021-07-09 RX ADMIN — DOCUSATE SODIUM 50 MG AND SENNOSIDES 8.6 MG 1 TABLET: 8.6; 5 TABLET, FILM COATED ORAL at 09:03

## 2021-07-09 RX ADMIN — Medication 10 ML: at 20:34

## 2021-07-09 RX ADMIN — LOSARTAN POTASSIUM 100 MG: 25 TABLET, FILM COATED ORAL at 09:03

## 2021-07-09 ASSESSMENT — PAIN SCALES - GENERAL
PAINLEVEL_OUTOF10: 0

## 2021-07-09 ASSESSMENT — ENCOUNTER SYMPTOMS
GASTROINTESTINAL NEGATIVE: 1
ALLERGIC/IMMUNOLOGIC NEGATIVE: 1
RESPIRATORY NEGATIVE: 1
EYES NEGATIVE: 1

## 2021-07-09 NOTE — CONSULTS
Clinical Pharmacy Consult Note    78 y.o. female with head CT with concern for brain mets of L parietal lobe. Pharmacy has been asked by BLAYNE Toure to adjust all drips to normal saline as appropriate based on compatibility to avoid fluid shifts since D5 is osmotically active. The following intermittent IV drips/infusions have been adjusted to saline:  Cefazolin     Please be aware that patient has D5W ordered as part of hypoglycemia orderset. Total IV fluid delivered to patient over last 24h: n/a - will assess tomorrow    [facility] will follow daily to ensure all new IVPBs + drips are in NS. Please call with questions.   Jalyn Valdez PharmD, BCPS  Wireless: [id number]   7/9/2021 11:48 AM

## 2021-07-09 NOTE — PROGRESS NOTES
Occupational Therapy   Occupational Therapy Initial Assessment / Treatment  Date: 2021   Patient Name: Nicole Curiel  MRN: [id number]     : 1950    Date of Service: 2021    Discharge Recommendations: Nicole Curiel scored a 20/24 on the AM-PAC ADL Inpatient form. Current research shows that an AM-PAC score of 18 or greater is typically associated with a discharge to the patient's home setting. Based on the patient's AM-PAC score, and their current ADL deficits, it is recommended that the patient have 2-3 sessions per week of Occupational Therapy at d/c to increase the patient's independence. At this time, this patient demonstrates the endurance and safety to discharge home with OP services and a follow up treatment frequency of 2-3x/wk. Please see assessment section for further patient specific details. If patient discharges prior to next session this note will serve as a discharge summary. Please see below for the latest assessment towards goals. OT Equipment Recommendations  Equipment Needed: Yes  Mobility Devices: ADL Assistive Devices; Luberta Ubaldo: Rolling  ADL Assistive Devices: Shower Chair with back    Assessment   Performance deficits / Impairments: Decreased functional mobility ; Decreased balance;Decreased ADL status; Decreased safe awareness;Decreased high-level IADLs;Decreased endurance;Decreased strength  Assessment: Pt is a 78 y.o. F admitted w/ R-sided weakness and lethargy and diagnosed w/ brain mass. Pt is s/p L frontal craniotomy and biopsy on . Pt is independent w/ ADLs at baseline and typically ambulates w/o AD. Pt currently requires CGA-SBA for LB dressing and toileting and demonstrates mild unsteadiness during dynamic standing and ambulation. Pt demo'd improved stability w/ use of RW for ambulation. Pt presents w/ RUE/RLE weakness and decreased speed w/ RUE. Discussed obtaining shower chair to increase safety with bathing d/t balance deficits.  Pt would benefit from 24 hr spvn at d/c and OP OT to address remaining strength, coordination, and balance deficits. Pt benefits from continued skilled OT, cont to follow while inpt. Cont OT per POC. Treatment Diagnosis: decreased ADLs and RUE strength d/t brain mass  Prognosis: Good  Decision Making: Medium Complexity  OT Education: OT Role;Plan of Care;Home Exercise Program;ADL Adaptive Strategies;Transfer Training;Equipment  Patient Education: pt verb understanding  REQUIRES OT FOLLOW UP: Yes  Activity Tolerance  Activity Tolerance: Patient Tolerated treatment well  Activity Tolerance: Pt w/ mild SOB during ambulation, improved w/ seated rest breaks  Safety Devices  Safety Devices in place: Yes  Type of devices: Call light within reach; Chair alarm in place; Left in chair;Nurse notified           Patient Diagnosis(es): There were no encounter diagnoses. has a past medical history of Colon cancer ([facility] [address] and High blood pressure. has a past surgical history that includes Breast biopsy; Tubal ligation; Colon surgery; and craniotomy (Left, 7/8/2021). Treatment Diagnosis: decreased ADLs and RUE strength d/t brain mass      Restrictions  Position Activity Restriction  Other position/activity restrictions: up with A; HOB 30    Subjective   General  Chart Reviewed: Yes  Patient assessed for rehabilitation services?: Yes  Additional Pertinent Hx: 78 y.o. F presents to [facility] with persistent right sided weakness/lethargy. Hospital Course: CT head was done, it was concerning for a mass in her left parietal lobe; Neurosurgery Consult: OR for biopsy of left frontal lesion. S/p CRANIOTOMY STEREOTATIC BIOPSY LEFT FRONTAL on 7/8      PMH: HTN and colon cancer, treated in 2000 with partial colectomy and chemotherapy  Family / Caregiver Present: No  Referring Practitioner: Dr. Maya Delvalle  Diagnosis: Pallavi Beth  Subjective: Pt seated on edge of bed upon arrival, pleasant and agreeable to OT eval / tx.  Pt requesting to go to the bathroom. General Comment  Comments: Daughter called during session and therapy briefly educated her on pt's current functional level and importance of 24 hr spvn at d/c for safety. Pt's daughter reported family can likely provide 24 hr, however also reported feeling overwhelmed with the situation as she works full time and has 3 children. Pt and daughter report [facility] therapy would not be a good option as they have a very large and over-protective dog who does not like strangers. Patient Currently in Pain: Denies      Social/Functional History  Social/Functional History  Lives With: Daughter, Son (grandkids; son is slow learner but she doesn't have to physically help him)  Type of Home: House  Home Layout: Bed/Bath upstairs, Two level (full bathroom on both floors)  Home Access: Stairs to enter without rails  Entrance Stairs - Number of Steps: 1 FRANNY  Bathroom Shower/Tub: Tub/Shower unit  Bathroom Toilet: Standard (sink next to for leverage)  Home Equipment:  (none)  ADL Assistance: Independent  Homemaking Assistance: Needs assistance (she does own laundry, cooking; dtr does grocery shopping)  Ambulation Assistance: Independent  Transfer Assistance: Independent  Active : No  Patient's  Info: Daughter drives  Occupation: Retired  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 States she got a little banged up but nothing serious. Reports no other falls.        Objective   Vision: Within Functional Limits  Hearing: Exceptions to [facility]  Hearing Exceptions: Hard of hearing/hearing concerns    Orientation  Overall Orientation Status: Within Functional Limits (oriented to month but not exact date)  Observation/Palpation  Observation: IV LUE, surgical incision w/ staples on head  Balance  Sitting Balance: Supervision  Standing Balance: Contact guard assistance  Standing Balance  Time: ~30 mins total  Activity: functional mobility, transfers, stance for ADLs  Comment: mildly unsteady, improved stability w/ use of RW for second half of session  Functional Mobility  Functional - Mobility Device: No device  Activity: To/from bathroom; Other (ambulation in hallway)  Assist Level: Contact guard assistance  Functional Mobility Comments: CGA during functional mobility without AD approx. 100 ft w/ one LOB; SBA with AD approx. 200 ft and no loss of balance  Toilet Transfers  Toilet - Technique: Ambulating  Equipment Used: Standard toilet  Toilet Transfer: Stand by assistance  Toilet Transfers Comments: + L GB  ADL  Feeding: Setup (To drink beverage; Pt spilled on gown while holding cup w/ RUE)  Grooming: Stand by assistance (Pt combed hair, washed face, and completed hand hygiene in stance at sink)  UE Dressing: Minimal assistance (To doff/don gown seated edge of bed; Min A to manage cardiac monitor)  LE Dressing: Contact guard assistance (Pt threaded LEs into brief w/ + time and pulled up in stance w/ CGA)  Toileting: Contact guard assistance (Pt continent of urine and completed pericare seated Mod I; Pt completed pants mgmt up w/ CGA)  Tone RUE  RUE Tone: Normotonic  Tone LUE  LUE Tone: Normotonic  Coordination  Movements Are Fluid And Coordinated: No  Coordination and Movement description: Fine motor impairments;Gross motor impairments;Right UE;Decreased speed  Quality of Movement Other  Comment: Pt w/ good performance of incorporate RUE into ADLs, however limited by decreased speed and strength. Pt able to oppose thumb to each digit and alternating finger to nose w/o difficulty. Pt very guarded w/ RUE througout session, tending to hold it in shoulder retraction / minimal elbow flexion until cued to let it hang \"normally\" down at her side.           Bed mobility  Supine to Sit: Supervision  Sit to Supine: Supervision  Transfers  Sit to stand: Stand by assistance (from EOB, toilet, and recliner)  Stand to sit: Stand by assistance  Transfer Comments: VC for hand placement w/ RW  Vision - Basic Assessment  Patient Visual Report: No visual complaint reported. Cognition  Overall Cognitive Status: Exceptions  Arousal/Alertness: Appropriate responses to stimuli (slow response noted at times)  Safety Judgement: Decreased awareness of need for assistance  Problem Solving: Decreased awareness of errors  Insights: Decreased awareness of deficits           Sensation  Overall Sensation Status: WFL  Type of ROM/Therapeutic Exercise  Type of ROM/Therapeutic Exercise: AROM  Comment: Pt educated on completing simple RUE therex to address strength and ROM for improved performance during ADLs. Pt completed x10 reps shoulder flexion and x10 reps elbow flexion seated in bedside chair. LUE AROM (degrees)  LUE AROM : WFL  Left Hand AROM (degrees)  Left Hand AROM: WFL  RUE AROM (degrees)  RUE AROM : WFL  RUE General AROM: slightly less than full shoulder flexion and shoulder abduction  Right Hand AROM (degrees)  Right Hand AROM: WFL  LUE Strength  Gross LUE Strength: [facility]  RUE Strength  Gross RUE Strength: Exceptions to [facility]  R Shoulder Flex: 4/5  R Elbow Flex: 4/5  R Wrist Flex: 4/5  R Wrist Ext: 4/5  R Hand General: 4/5              Treatment included functional transfer training, ADLs, and patient education. This note will serve as a discharge summary if patient is discharged from hospital before next treatment session.         Plan   Plan  Times per week: 5-7x  Times per day: Daily  Current Treatment Recommendations: Strengthening, Safety Education & Training, Balance Training, Patient/Caregiver Education & Training, Self-Care / ADL, Functional Mobility Training, Equipment Evaluation, Education, & procurement, Endurance Training    G-Code     OutComes Score                                                  AM-PAC Score             Goals  Short term goals  Time Frame for Short term goals: by d/c  Short term goal 1: pt will complete LB dressing Mod I  Short term goal 2: pt will complete toilet transfer w/ spvn  Short term goal 3: pt will complete toileting Mod I  Short term goal 4: pt will demo independence w/ RUE HEP to increase strength for ADL completion  Patient Goals   Patient goals : to return home       Therapy Time   Individual Concurrent Group Co-treatment   Time In 0800         Time Out 0854         Minutes 54         Timed Code Treatment Minutes: 54 Minutes ((15 min eval + 39 min tx))       Lidya Roca, OT

## 2021-07-09 NOTE — PROGRESS NOTES
Patient is alert and oriented x4. Vital signs stable. per patient, numbness to RUE has almost resolved. Surgical site to upper left side of scalp remains CDI, CHESTER. Denies having any pain. Tolerating diet well, denies n/v. Voiding without complications. Ambulating x1 SBA with gait belt and walker. Fall precautions in place. Bed locked in lowest position with alarm on. Call light within reach and patient using appropriately. Resting in bed with no further needs. Will continue to monitor.

## 2021-07-09 NOTE — PROGRESS NOTES
ceFAZolin (ANCEF) 2,000 mg in dextrose 5 % 50 mL IVPB (mg) 2,000 mg New Bag 07/09/21 0332     2,000 mg New Bag 07/08/21 2021                 Neurologic Exam:    Mental status: awake/alert, oriented to person/place, thought it was 2001- some short term memory issues  Language: baseline mild dysarthria   Attention: intact     Cranial Nerves:  II: Visual acuity not tested, visual fields intact, denies new visual changes / diplopia  III, IV, VI: PERRL, 3 mm bilaterally, EOMI, no nystagmus noted  V: Facial sensation intact bilaterally to touch  VII: mild right facial weakness with baseline dysarthria   VIII: Hearing intact bilaterally to spoken voice  IX: Palate movement equal bilaterally  XI: Shoulder shrug equal bilaterally  XII: Tongue midline    Musculoskeletal:   Gait: Not tested   Tone: normal   Sensory: decreased sensation RUE  Motor strength:    Right  Left    Right  Left    Deltoid  4+ 5  Hip Flex  4+ 5   Biceps  4+ 5  Knee Extensors  4+ 5   Triceps  4+ 5  Knee Flexors  4+ 5   Wrist Ext  4+ 5  Ankle Dorsiflex. 4+ 5   Wrist Flex  4+ 5  Ankle Plantarflex. 4+ 5   Handgrip  4+ 5  Ext Zacarias Longus  4+ 5   Thumb Ext  4+ 5         Incision: c/d/i CHESTER    Respiratory:  Unlabored respiratory pattern    Abdomen:   Soft, ND     Cardiovascular:  Warm, well perfused    Assessment   78year old woman with a history of colon cancer in 2000 who presented with right hemiparesis. MRI demonstrated lesions in the posterior left frontal region as well as within the cingulate gyrus on the left. Now POD 1 s/p CRANIOTOMY STEREOTATIC BIOPSY LEFT FRONTAL by Dr. Carrasco :  1. Neurologic exam frequency: q 4   2. Mobility: PT/OT, activity as tolerated  3. Steroids: decadron, PPI/SSI  4. Seizure Prophylaxis: continue Keppra BID  5. Diet: ADAT   6. Antibiotics: ancef post op  7. Harvey: remove   8. DVT Prophylaxis: SCDs, SQ heparin    9. Bowel Regimen: senokot, glycolax  10. Pain control: tylenol, ultram  11.  Incisional Care: cleanse daily with soap/water, pat dry with clean towel and paint with CHG swab  12. Patient educated from Guide to Neurosurgery book, page 25-29  13. Oncology following, radiation oncology consulted  14. Will follow, please call with questions. Dispo Planning: Ok to dispo from [facility] standpoint once ok with oncology/rad onc- SW needed for assistance with discharge plan as daughter has concerns of patient returning home    Follow up with Dr. Jenifer Palmer in 2 weeks for post op appointment. Patient was seen and examined with Dr. Ramo Corrales who agrees with above assessment and plan. Electronically signed by: BLAYNE Ulrich NP, 7/9/2021 11:45 AM   Neurosurgery Nurse Practitioner  [phone]      __________________________________________________________________    I saw and examined Ankur Moralez on 07/10/21. I agree with the assessment and plan as detailed above.      Nate Martin MD, PhD  [address], [address] [phone] (c), [phone] (o)

## 2021-07-09 NOTE — ONCOLOGY
[facility]      Name:  Prateek Vega  : 1950    Diagnosis: L fronto-parietal brain tumor    Chief Complaint:    L fronto-parietal brain tumor    HPI    Prateek Vega is a 78 y.o. female who presents today upon referral from Dr. Julienne Contreras to discuss adjuvant options in the setting of L fronto-parietal brain tumor . She presented with R hemiparesis and was found to have multifocal enhancing L fronto-parietal lesions with significant surrounding FLAIR hyperintensity. She underwent biopsy yesterday, path pending although with preliminary DX of GBM. Patient's medications, allergies, past medical, surgical, social and family histories were reviewed and updated as appropriate. Review of Systems    Review of Systems - see HPI. Otherwise stable. Physical Exam    Vitals:    21 1457   BP: 107/65   Pulse: 74   Resp: 16   Temp: 98.2 °F (36.8 °C)   SpO2: 95%       General appearance: alert and cooperative  Head: Normocephalic, without obvious abnormality, atraumatic  Neck: No palpable lymphadenopathy in supraclavicular or cervical chains  Lungs: Clear to auscultation bilaterally, no audible rales, wheezes or crackles  Heart: Regular rate and rhythm, S1, S2 normal  Abdomen: Soft, non-tender; bowel sounds normal; no masses,  no organomegaly  Extremities: without cyanosis, clubbing, edema or asymmetry  Skin: No jaundice, purpura or petechiae  Neuro: slight loss of  strength on R. Could not evaluate LEs. Imaging:   MRI brain w wo 21:  1. Left cerebral multiple areas of abnormal signal and enhancement are favored to represent multifocal glioblastoma or anaplastic astrocytoma, particularly given the component of expansile infiltrative FLAIR hyperintensity involving cortical gray matter    and white matter at the posterior inferior left parietal lobe. Therefore, metastatic disease is less likely.    2. 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 Pt will likely require chemoirradiation. Will arrange for visit with my partner Dr. Marifer Castillo at [facility] in upcoming wk.     Thank you for this referral,  Shona Jack MD

## 2021-07-09 NOTE — PROGRESS NOTES
[facility]  Progress Note    Subjective: Tolerated craniotomy and biopsy  No Headache    Review of Systems:     Review of Systems   Constitutional: Negative. Negative for fatigue and fever. HENT: Negative. Eyes: Negative. Respiratory: Negative. Cardiovascular: Negative. Gastrointestinal: Negative. Endocrine: Negative. Genitourinary: Negative. Musculoskeletal: Negative. Skin: Negative. Allergic/Immunologic: Negative. Neurological: Negative. Hematological: Negative. Objective:     HISTORY OF PRESENT ILLNESS:  Ms. Erlinda Gaitan  is a 78 y.o. female we are seeing in consultation for radiographic abnormalities concerning for a primary CNS malignancy. She presented to the emergency room with persistent right-sided weakness. She states that her symptoms began on July 3. She underwent imaging with a head CT in the ER that showed a brain mass. She has been admitted for further evaluation. Since admission, she has been treated with steroids which have improved her right-sided weakness.     It is of note that she has a remote history of colon cancer. She is a bit unsure of the details but describes having undergone a surgery and chemotherapy treatment. She has not seen an oncologist in many years.     Medications    Current Facility-Administered Medications: dextrose 50 % IV solution, 12.5 g, Intravenous, PRN  insulin regular (HUMULIN R;NOVOLIN R) injection 0-15 Units, 0-15 Units, Subcutaneous, 4x Daily AC & HS  sodium chloride flush 0.9 % injection 10 mL, 10 mL, Intravenous, 2 times per day  sodium chloride flush 0.9 % injection 10 mL, 10 mL, Intravenous, PRN  0.9 % sodium chloride infusion, 25 mL, Intravenous, PRN  ceFAZolin (ANCEF) 2,000 mg in dextrose 5 % 50 mL IVPB, 2,000 mg, Intravenous, Q8H  naloxone (NARCAN) injection 0.2 mg, 0.2 mg, Intravenous, PRN  aluminum & magnesium hydroxide-simethicone (MAALOX) 200-200-20 MG/5ML suspension 15 mL, 15 mL, Oral, Q6H PRN  polyethylene glycol (GLYCOLAX) packet 17 g, 17 g, Oral, Daily  sennosides-docusate sodium (SENOKOT-S) 8.6-50 MG tablet 1 tablet, 1 tablet, Oral, BID  bisacodyl (DULCOLAX) suppository 10 mg, 10 mg, Rectal, Daily PRN  promethazine (PHENERGAN) tablet 12.5 mg, 12.5 mg, Oral, Q6H PRN **OR** ondansetron (ZOFRAN) injection 4 mg, 4 mg, Intravenous, Q6H PRN  acetaminophen (TYLENOL) tablet 650 mg, 650 mg, Oral, Q4H PRN  HYDROmorphone (DILAUDID) injection 0.25 mg, 0.25 mg, Intravenous, Q3H PRN **OR** HYDROmorphone (DILAUDID) injection 0.5 mg, 0.5 mg, Intravenous, Q3H PRN  0.9 % sodium chloride infusion, , Intravenous, Continuous  traMADol (ULTRAM) tablet 50 mg, 50 mg, Oral, Q6H PRN **OR** traMADol (ULTRAM) tablet 100 mg, 100 mg, Oral, Q6H PRN  heparin (porcine) injection 5,000 Units, 5,000 Units, Subcutaneous, Q8H  glucose (GLUTOSE) 40 % oral gel 15 g, 15 g, Oral, PRN  dextrose 50 % IV solution, 12.5 g, Intravenous, PRN  glucagon (rDNA) injection 1 mg, 1 mg, Intramuscular, PRN  dextrose 5 % solution, 100 mL/hr, Intravenous, PRN  pantoprazole (PROTONIX) tablet 40 mg, 40 mg, Oral, QAM AC  amLODIPine (NORVASC) tablet 10 mg, 10 mg, Oral, Daily  levothyroxine (SYNTHROID) tablet 25 mcg, 25 mcg, Oral, Daily  losartan (COZAAR) tablet 100 mg, 100 mg, Oral, Daily  levETIRAcetam (KEPPRA) 500 mg in sodium chloride 0.9 % 100 mL IVPB, 500 mg, Intravenous, Q12H  dexamethasone (DECADRON) injection 4 mg, 4 mg, Intravenous, Q6H    Allergies  No Known Allergies    Physical Exam  VITALS:  /70   Pulse 63   Temp 98.3 °F (36.8 °C) (Oral)   Resp 16   Ht 5' 3\" (1.6 m)   Wt 197 lb (89.4 kg)   SpO2 94%   BMI 34.90 kg/m²   TEMPERATURE:  Current - Temp: 98.3 °F (36.8 °C);  Max - Temp  Av.5 °F (35.8 °C)  Min: 93.2 °F (34 °C)  Max: 98.6 °F (37 °C)  PULSE OXIMETRY RANGE: SpO2  Av.7 %  Min: 86 %  Max: 100 %  24HR INTAKE/OUTPUT:      Intake/Output Summary (Last 24 hours) at 2021 0732  Last data filed at 2021 0341  Gross per 24 hour   Intake 2257 ml   Output 510 ml   Net 1747 ml       Physical Exam  Constitutional:       General: She is not in acute distress. HENT:      Head:      Comments: Scalp incision clean dry and intact  Eyes:      General: No scleral icterus. Cardiovascular:      Rate and Rhythm: Normal rate and regular rhythm. Heart sounds: No murmur heard. No gallop. Pulmonary:      Effort: Pulmonary effort is normal.      Breath sounds: Normal breath sounds. Abdominal:      Palpations: Abdomen is soft. Tenderness: There is no abdominal tenderness. Musculoskeletal:         General: No swelling. Lymphadenopathy:      Cervical: No cervical adenopathy. Skin:     General: Skin is warm and dry. Findings: No rash. Neurological:      Mental Status: She is alert and oriented to person, place, and time. Labs  Recent Labs     07/06/21 1725 07/08/21  0608 07/09/21  0530   WBC 13.0* 9.6 17.6*   HGB 11.8* 12.6 11.2*   HCT 35.1* 37.3 33.3*    346 318   MCV 81.2 81.8 81.8       Recent Labs     07/06/21 1725 07/08/21 0608 07/09/21  0530    136 139   K 3.6 4.0 4.3    100 103   CO2 23 26 25   BUN 23* 9 16   CREATININE 1.1 0.8 0.8       No results for input(s): AST, ALT, ALB, BILIDIR, BILITOT, ALKPHOS in the last 72 hours. No results for input(s): MG in the last 72 hours. Radiology  XR SHOULDER RIGHT (MIN 2 VIEWS)    Result Date: 7/4/2021  EXAMINATION: THREE XRAY VIEWS OF THE RIGHT SHOULDER 7/4/2021 4:37 pm COMPARISON: September 2017 HISTORY: ORDERING SYSTEM PROVIDED HISTORY: R shoulder pain after fall TECHNOLOGIST PROVIDED HISTORY: Reason for exam:->R shoulder pain after fall Reason for Exam: right shoulder pain from fall Acuity: Acute Type of Exam: Initial FINDINGS: Alignment at the [facility] joint and glenohumeral joint appears normal.  Moderate spurring is seen at the [facility] joint. Left-sided port is seen No focal consolidation in the lungs.  Spurring is seen in the spine     Degenerative change. No acute osseous abnormality     CT Head wo Contrast    Result Date: 7/8/2021  HISTORY: Brain mass. Status post biopsy. [facility] EXAM : CT OF THE HEAD WITHOUT CONTRAST TECHNIQUE: Multiple axial images were obtained of the brain without the use of contrast. Individualized dose optimization technique was used in order to meet ALARA standards for radiation dose reduction. In addition to vendor specific dose reduction algorithms, the dose reduction techniques vary based on the specific scanner utilized but frequently include automated exposure control, adjustment of the mA and/or kV according to patient size, and use of iterative reconstruction technique. COMPARISON: MRI brain 7/7/2021. FINDINGS: The visualized paranasal sinuses, mastoid air cells and middle ears are clear to the extent visualized. Status post gurinder hole craniotomy over the superior left frontal region for biopsy. Intracranially, there is a small gas bubble consistent with biopsy site corresponding to the more anterior enhancing lesion in the left frontal lobe. The more chronic appearing enhancing lesion in the posterior left frontal lobe remains stable accounting  for differences in technique. No significant intracranial hemorrhage or acute complication status post biopsy. Ventricles and cisternal spaces remain appropriate. 1. Status post biopsy of the left frontal mass without evidence of acute complication. CT HEAD WO CONTRAST    Result Date: 7/4/2021  EXAMINATION: CT OF THE HEAD WITHOUT CONTRAST  7/4/2021 5:04 pm TECHNIQUE: CT of the head was performed without the administration of intravenous contrast. Dose modulation, iterative reconstruction, and/or weight based adjustment of the mA/kV was utilized to reduce the radiation dose to as low as reasonably achievable. COMPARISON: None.  HISTORY: ORDERING SYSTEM PROVIDED HISTORY: RUE and RLE numbness, R hand clumsiness and decreased hand strength TECHNOLOGIST PROVIDED HISTORY: Has a \"code stroke\" or \"stroke alert\" been called? ->No Reason for exam:->RUE and RLE numbness, R hand clumsiness and decreased hand strength Decision Support Exception - unselect if not a suspected or confirmed emergency medical condition->Emergency Medical Condition (MA) Reason for Exam: RUE and RLE numbness, R hand clumsiness and decreased hand strength Acuity: Acute Type of Exam: Initial FINDINGS: BRAIN/VENTRICLES: Ventricles are midline in position. There is a heterogeneous area of hypodensity in the posterior left parietal lobe with a suspected nodule measuring 2.0 x 1.2 cm cm. There is surrounding vasogenic edema. Mild periventricular hypodensity is seen. Scattered additional areas of hypodensity are seen throughout the frontal parietal white matter. There is intracranial atherosclerosis. . ORBITS: The visualized portion of the orbits demonstrate no acute abnormality. SINUSES: No significant mastoid opacification noted. No air-fluid levels noted. Spurring is seen at the tip of the dens. SOFT TISSUES/SKULL:  No acute abnormality of the visualized skull or soft tissues. Focal intra-axial nodule or mass in the posterior left parietal lobe, suspicious for intracranial metastasis, in the absence of clinical signs of infection which would suggest that this could represent an abscess. . Correlate for any history of known primary. Mild periventricular small vessel ischemic change, with mild atrophy. XR CHEST PORTABLE    Result Date: 7/4/2021  EXAMINATION: ONE XRAY VIEW OF THE CHEST 7/4/2021 4:37 pm COMPARISON: 06/11/2020 HISTORY: ORDERING SYSTEM PROVIDED HISTORY: RUE and RLE numbness, R hand clumsiness and decreased hand strength TECHNOLOGIST PROVIDED HISTORY: Reason for exam:->RUE and RLE numbness, R hand clumsiness and decreased hand strength Reason for Exam: numbness Acuity: Acute Type of Exam: Initial FINDINGS: Tip MediPort projects at the cavoatrial junction. Heart size is stable. Mediastinal contours are stable. Pulmonary vascularity is stable. No focal lung consolidation is noted. .  Patient body habitus limits evaluation of fine pulmonary parenchymal changes. Degenerative changes are seen in the spine and shoulder joints. Negative low lung volume study. No pneumonia or edema     CTA HEAD NECK W CONTRAST    Result Date: 7/4/2021  EXAMINATION: CTA OF THE HEAD AND NECK WITH CONTRAST 7/4/2021 5:05 pm: TECHNIQUE: CTA of the head and neck was performed with the administration of intravenous contrast. Multiplanar reformatted images are provided for review. MIP images are provided for review. Stenosis of the internal carotid arteries measured using NASCET criteria. Dose modulation, iterative reconstruction, and/or weight based adjustment of the mA/kV was utilized to reduce the radiation dose to as low as reasonably achievable. COMPARISON: CT head 07/04/2020 HISTORY: ORDERING SYSTEM PROVIDED HISTORY: RUE and RLE numbness, R hand clumsiness and decreased hand strength TECHNOLOGIST PROVIDED HISTORY: Reason for exam:->RUE and RLE numbness, R hand clumsiness and decreased hand strength Reason for Exam: RUE and RLE numbness, R hand clumsiness and decreased hand strength Acuity: Acute Type of Exam: Initial FINDINGS: CTA NECK: AORTIC ARCH/ARCH VESSELS: No dissection or arterial injury. No significant stenosis of the brachiocephalic or subclavian arteries. CAROTID ARTERIES: No dissection, arterial injury, or hemodynamically significant stenosis by NASCET criteria. VERTEBRAL ARTERIES: No dissection, arterial injury, or significant stenosis. SOFT TISSUES: The lung apices are clear. No cervical or superior mediastinal lymphadenopathy. The larynx and pharynx are unremarkable. No acute abnormality of the salivary and thyroid glands. Heterogeneous appearance of thyroid gland with multiple nodules.   This could be further evaluate with thyroid ultrasound if this may change patient management BONES: Mild-to-moderate multilevel degenerate changes. CTA HEAD: ANTERIOR CIRCULATION: No significant stenosis of the intracranial internal carotid, anterior cerebral, or middle cerebral arteries. No aneurysm. POSTERIOR CIRCULATION: Bilateral fetal origins no significant stenosis of the vertebral, basilar, or posterior cerebral arteries. No aneurysm. OTHER: No dural venous sinus thrombosis on this non-dedicated study. BRAIN: Subcortical hypoattenuation involving left parietal lobe just deep to the central sulcus noted with area of ring enhancement noted. There is a focus of increased attenuation identified more inferiorly to this noted. No large vessel occlusion or hemodynamic stenosis involving intracranial cervical vessels. Left parietal abnormalities with ring enhancement. Consider MRI further characterization without and with contrast.     CT CHEST ABDOMEN PELVIS W CONTRAST    Result Date: 7/7/2021  CT CHEST, ABDOMEN AND PELVIS: INDICATION: Brain mass. Assess for malignancy. . TECHNIQUE: Spiral CT images of the chest, abdomen and pelvis were obtained following the intravenous and oral administration of contrast. Patient received a total of 80 mL of Isovue-370 contrast. Up-to-date CT equipment and radiation dose reduction techniques were employed. COMPARISON: NONE. CHEST: No axillary, mediastinal or hilar lymphadenopathy is identified. No pericardial effusion is identified. Thoracic aorta is without aneurysm. No pulmonary mass is identified. No infiltrates are identified. Central airways are patent. Interstitial lung markings are normal.     1. No evidence of any thoracic metastatic disease. ABDOMEN: The liver, gallbladder, pancreas, spleen and adrenal glands are normal. Renal size and enhancement are normal. No abdominal lymphadenopathy is identified. PELVIS: No pelvic lymphadenopathy is identified. There is a fat-containing left inguinal hernia. No adnexal mass is identified. The patient is status post a prior sigmoidectomy.  No bowel dilatation or bowel wall thickening is identified. No osteolytic or osteoblastic lesions are identified. IMPRESSION: 1. No evidence of any abdominal or pelvic metastatic disease. 2. Status post sigmoidectomy. MRI BRAIN W WO CONTRAST    Result Date: 7/7/2021  EXAM: MRI BRAIN W WO CONTRAST INDICATION: Brain mass, surgical treatment planning COMPARISON: Head -[id number] TECHNIQUE: Multiplanar, multisequence MR imaging of the head obtained without and with contrast. IV contrast: ProHance 19 mL FINDINGS: MIDLINE STRUCTURES: The sella turcica and pituitary gland are normal. Craniovertebral alignment and cerebellar tonsils are normal. VENTRICLES: Normal size; no midline shift INTRACRANIAL HEMORRHAGE: None. BRAIN PARENCHYMA: At the posterior inferior left parietal lobe on axial postcontrast FLAIR series 11, images 26-31, there is infiltrative masslike FLAIR hyperintensity which involves cortical gray matter and white matter. The postcontrast T1 images demonstrate very subtle  nodular/curvilinear enhancement at this location difficult to accurately measure. There is a component of diffusion restriction involving a portion of the FLAIR hyperintensity (DWI series 4, image 66; ADC map series 5, image 16). At the parafalcine aspect of left frontal lobe centered along the junction of the left superior frontal gyrus and cingulate gyrus, there is a somewhat round hyperintense T2, hypointense T1 lesion lesion with enhancement measuring 13 x 14 x 15 mm (postcontrast axial series 10, image 145; coronal postcontrast image 90) involving cortex and subcortical white matter. The lesion demonstrates mild diffusion hyperintensity series 4, image 70. Centered in the posterior left frontal lobe centrum semiovale, there is a heterogeneous hyperintense T2, hypointense T1 lesion with predominantly thick, irregular ringlike enhancement 2.8 x 1.9 x 1.7 cm (axial postcontrast series 10, image 141; coronal postcontrast image 122).  The  superior margins of the enhancement extend superiorly along the juxtacortical aspect of the left precentral gyrus and posterior aspect of the superior frontal gyrus straddling the precentral sulcus (sagittal postcontrast image 58-71). The ring enhancement demonstrates diffusion restriction. There is a moderate zone of surrounding hyperintense T2/FLAIR signal involving the posterior left frontal white matter extending to the periventricular white matter and left posterior  body of corpus callosum (axial FLAIR image 32). Mass effect results in partial effacement of the left precentral sulcus on the central sulcus. There is a mild-to-moderate amount of additional periventricular and subcortical hyperintense T2/FLAIR signal most pronounced along the left frontal corona radiata. INTRACRANIAL VASCULATURE: Major intracranial vessels are grossly patent. ORBITS: Normal. BONE MARROW: Bone marrow signal within normal limits. VISUALIZED CERVICAL SPINE: Normal PARANASAL SINUSES/MASTOID BONES: No acute sinusitis or mastoiditis. EXTRACRANIAL SOFT TISSUES: Normal     1. 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 Therefore, metastatic disease is less likely. 2. Mild to moderate chronic small vessel ischemia     Pathology  pend    Problem List  Patient Active Problem List   Diagnosis    SOB (shortness of breath)    Asthma    Sleep apnea    Chronic cough    Brain mass       Assessment and Plan:     Intracranial masses  - These have the radiographic appearance of being primary CNS malignancy such as GBM or astrocytoma. - Neurosurgery performed a craniotomy with biopsy  - Await pathology.   - I suspect management will include a combination of radiation therapy with Temodar followed by Temodar.  - No evidence of disease in CAT scan of the chest abdomen or pelvis. It is quite unlikely that the intracranial findings are related to her remote history of colon cancer.     - I will have her follow-up with our medical neuro oncologist, Dr. Sanjiv Cleveland in the outpatient setting.  - She will also follow-up with radiation oncology.     Chanda Alfred MD  7/9/2021

## 2021-07-09 NOTE — PROGRESS NOTES
Radiation oncology made an appointment for the patient at [address] [place] [address],[address] for Friday, the 16th, at [address] [place] [address].

## 2021-07-09 NOTE — PROGRESS NOTES
Physical Therapy    Facility/Department: [place] [address] 5T ORTHO/NEURO  Initial Assessment/Treatment    NAME: Dariana Deal  : 1950  MRN: [id number]    Date of Service: 2021    Discharge Recommendations: Dariana Deal scored a 19/24 on the AM-PAC short mobility form. Current research shows that an AM-PAC score of 18 or greater is typically associated with a discharge to the patient's home setting. Based on the patient's AM-PAC score and their current functional mobility deficits, it is recommended that the patient have 2-3 sessions per week of Physical Therapy at d/c to increase the patient's independence. At this time, this patient demonstrates the endurance and safety to discharge home with ** ( OP PT services) and a follow up treatment frequency of 2-3x/wk. Please see assessment section for further patient specific details. If patient discharges prior to next session this note will serve as a discharge summary. Please see below for the latest assessment towards goals. PT Equipment Recommendations  Equipment Needed: Yes  Mobility Devices: Jalyn [place]: Rolling    Assessment   Assessment: Pt seen POD 1 from craniotomy stereotactic biopsy. Pt with continued RUE/RLE weakness. 1 loss of balance when walking without device & demo very narrow HARLEY. Pt reports feeling much safer when rolling walker was introduced. Agreeable to getting a RW for home & will also borrow one from boyfriend in order to keep one on each floor of home. Recommend 24 hr A initially & OP PT- discussed at length with pt & daughter on phone. Will continue to follow.   Treatment Diagnosis: impaired mobility  Prognosis: Good  Decision Making: Medium Complexity  PT Education: Goals;PT Role;Plan of Care  Patient Education: need for A on stairs at home, need to use rolling walker at home- 1 for each floor- verbalized understanding  REQUIRES PT FOLLOW UP: Yes  Activity Tolerance  Activity Tolerance: Patient Tolerated treatment well       Patient Diagnosis(es): There were no encounter diagnoses. has a past medical history of Colon cancer ([facility] [address] and High blood pressure. has a past surgical history that includes Breast biopsy; Tubal ligation; Colon surgery; and craniotomy (Left, 7/8/2021). Restrictions  Position Activity Restriction  Other position/activity restrictions: up with A; HOB 30        Subjective  General  Chart Reviewed: Yes  Patient assessed for rehabilitation services?: Yes  Additional Pertinent Hx: 78 y.o. female with medical history of HTN and colon cancer, treated in 2000 with partial colectomy and chemotherapy, who presents to [facility] with persistent right sided weakness. Patient reports her symptoms started on July 3. She was seen in ER and underwent head imaging that showed brain mass. Patient could not stay in the hospital and asked to be discharged. She could not get outpatient work up scheduled soon enough and returned to the hospital.s/p [address] on 7/8. Family / Caregiver Present: No  Referring Practitioner: Desi Chavez MD  Diagnosis: Brain Mass  Follows Commands: Within Functional Limits  Subjective  Subjective: Found sitting EOB with PCA, needing to use restroom. Agreeable to PT. Spoke with dtr at end of session via phone. Dtr expressed concern regarding having home therapy due to large uncontrollable dog and would prefer OP PT if someone had late hours.   Pain Screening  Patient Currently in Pain: Denies  Vital Signs  Patient Currently in Pain: Denies       Orientation  Orientation  Overall Orientation Status: Within Functional Limits  Social/Functional History  Social/Functional History  Lives With: Daughter, Son (grandkids; son is slow learner but she doesn't have to physically help him)  Type of Home: House  Home Layout: Bed/Bath upstairs, Two level (full bathroom on both floors)  Home Access: Stairs to enter without rails  Entrance Stairs - Number of Steps: 1 FRANNY  Bathroom Shower/Tub: Tub/Shower unit  Bathroom Toilet: Standard (sink next to for leverage)  Home Equipment:  (none)  ADL Assistance: [address]: Needs assistance (she does own laundry, cooking; dtr does grocery shopping)  Ambulation Assistance: Independent  Transfer Assistance: Independent  Active : No  Patient's  Info: Daughter drives  Occupation: Retired  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 States she got a little banged up but nothing serious. Reports no other falls.        Objective          AROM RLE (degrees)  RLE AROM: WFL  AROM LLE (degrees)  LLE AROM : WFL  Strength RLE  Comment: hamd 4-, hip flex 4-, hip abd 4  Strength LLE  Strength LLE: WFL     Sensation  Overall Sensation Status: WFL  Bed mobility  Supine to Sit: Supervision  Sit to Supine: Supervision  Transfers  Sit to Stand: Stand by assistance  Stand to sit: Stand by assistance  Ambulation  Ambulation?: Yes  More Ambulation?: Yes  Ambulation 1  Surface: level tile  Device: No Device  Assistance: Contact guard assistance;Stand by assistance;Minimal assistance (1 LOB laterally requiring min A)  Quality of Gait: 1 loss of balance laterally, narrow HARLEY, decreased stride length; holds LUE close to body & lacks any arm swing  Distance: 15' x 2, 70'  Ambulation 2  Surface - 2: level tile  Device 2: Rolling Walker  Assistance 2: Stand by assistance  Quality of Gait 2: steady gait with walker, increased stride length compared to without walker; no loss of balance  Distance: 180'  Stairs/Curb  Stairs?: Yes (up/down 10 steps with 1 rail CGA for safety; attempted to educate pt in taking steps non-reciprocally but pt kept trying to do reciprocally.)     Balance  Sitting - Static: Good  Exercises  Straight Leg Raise: x 2 R indep  Heelslides: x 5 R indep  Hip Abduction: x 10 R indep  Ankle Pumps: x 10 B indep  Comments: Educated in performing LE ex on her own throughout day. Treatment included gait/transfer training, pt education.     Plan   Plan  Times per week: 5-7  Current Treatment Recommendations: Balance Training, Functional Mobility Training, Transfer Training, Gait Training, Stair training, Safety Education & Training  Safety Devices  Type of devices: Call light within reach, Chair alarm in place, Nurse notified, Left in chair                                                      AM-PAC Score  AM-PAC Inpatient Mobility Raw Score : 19 (07/09/21 1056)  AM-PAC Inpatient T-Scale Score : 45.44 (07/09/21 1056)  Mobility Inpatient CMS 0-100% Score: 41.77 (07/09/21 1056)  Mobility Inpatient CMS G-Code Modifier : CK (07/09/21 1056)          Goals  Short term goals  Time Frame for Short term goals: dc  Short term goal 1: Transfers supervision  Short term goal 2: Ambulate 200' with RW S  Short term goal 3: up/down flight of stairs with rail SBA  Short term goal 4: Demo indep with HEP x 10 reps  Patient Goals   Patient goals : home at dc       Therapy Time   Individual Concurrent Group Co-treatment   Time In 0801         Time Out 0856         Minutes 55           Timed Code Treatment Minutes:  40 Minutes    Total Treatment Minutes:  [address] Floors, [address] [facility]

## 2021-07-09 NOTE — CARE COORDINATION
Case Management Daily Note                    Date: 7/9/2021     Patient Name: Nicole Curiel    Date of Admission: 7/7/2021 12:40 AM  YOB: 1950    Length of Stay: 2  GMLOS: 2.3         Patient Admission Status: Inpatient  Diagnosis:Brain mass [G93.89]     ________________________________________________________________________________________  Discharge Plan: Home with home health care   From home independent with children for support. Insurance: Payor: MEDICARE / Plan: MEDICARE PART A AND B / Product Type: *No Product type* /   Is pre-cert/notification needed: none     Tentative discharge date: Weekend? Current barriers: Family concerned about care. Referrals completed: Not Applicable    Resources/ information provided: Not indicated at this time   ________________________________________________________________________________________  PT AM-PAC: 19 / 24 per last evaluation on: 7/9    OT AM-PAC: 20 / 24 per last evaluation on: 7/9    DME Needs for discharge: [facility]   ________________________________________________________________________________________  Notes/Plan of Care:   SW rounded on this date. SW made aware of consult. Reportedly, daughter has concerns of patient returning home at discharge. Unfortunately, patient does not qualify for a short-term rehab stay and will need to return to the community setting. As scores declined for patient, LESLY can arrange from home health care and a rolling walker for discharge. LESLY placed a call to Sari Shah, daughter [phone] at 5:00 pm. Sari Shah has 3 children at home and a large dog. She has concerns. LESLY dicussed Medicare guidelines and qualifications. Daughter is feeling overwhelmed. She can help with some care, but is concerned if she needs 24 hour care she could not provide that. LESLY dicussed that Medicare does not provide assisted living/long-term.      Ashley Postal and/or her family were provided with choice of provider; she and/or her family are in agreement with the discharge plan at this time.     Care Transition Patient: Yes    ROSEANN Sierra  [facility]   Case Management Department  Ph: [phone]

## 2021-07-09 NOTE — PROGRESS NOTES
Hospitalist Progress Note      PCP: No primary care provider on file. Date of Admission: 2021    Chief Complaint on Admission: Brain mass    Pt Seen/Examined and Chart Reviewed. Admitting dx as above    SUBJECTIVE/OBJECTIVE:   Patient is admitted from community for work-up of newly found brain mass. Underwent brain biopsy on 2021. Tolerated procedure well. Denies significant headache. Tolerating p.o.. No shortness of breath or chest pain. Allergies  Patient has no known allergies. Medications      Scheduled Meds:   insulin lispro  0-12 Units Subcutaneous TID WC    insulin lispro  0-6 Units Subcutaneous Nightly    dexamethasone  4 mg Oral 4 times per day    levETIRAcetam  500 mg Oral BID    ceFAZolin  2,000 mg Intravenous Q8H    sodium chloride flush  10 mL Intravenous 2 times per day    polyethylene glycol  17 g Oral Daily    sennosides-docusate sodium  1 tablet Oral BID    heparin (porcine)  5,000 Units Subcutaneous Q8H    pantoprazole  40 mg Oral QAM AC    amLODIPine  10 mg Oral Daily    levothyroxine  25 mcg Oral Daily    losartan  100 mg Oral Daily       Infusions:   sodium chloride      sodium chloride 75 mL/hr at 21 1726    dextrose         PRN Meds:  sodium chloride flush, sodium chloride, naloxone, aluminum & magnesium hydroxide-simethicone, bisacodyl, promethazine **OR** ondansetron, acetaminophen, HYDROmorphone **OR** HYDROmorphone, traMADol **OR** traMADol, glucose, dextrose, glucagon (rDNA), dextrose    Vitals    TEMPERATURE:  Current - Temp: 98.4 °F (36.9 °C);  Max - Temp  Av.3 °F (35.7 °C)  Min: 95.7 °F (35.4 °C)  Max: 98.5 °F (36.9 °C)  RESPIRATIONS RANGE: Resp  Av.7  Min: 0  Max: 20  PULSE RANGE: Pulse  Av.2  Min: 63  Max: 94  BLOOD PRESSURE RANGE:  Systolic (00CSF), BRANDEN:103 , Min:87 , MNO:367   ; Diastolic (97AGH), RIM:84, Min:47, Max:85    PULSE OXIMETRY RANGE: SpO2  Av.3 %  Min: 86 %  Max: 100 %  24HR INTAKE/OUTPUT: Intake/Output Summary (Last 24 hours) at 7/9/2021 1231  Last data filed at 7/9/2021 0912  Gross per 24 hour   Intake 1377 ml   Output 510 ml   Net 867 ml       Exam:      General appearance: No apparent distress, appears stated age and cooperative. Lungs: Clear to ascultation, bilaterally without Rales/Wheezes/Rhonchi with good respiratory effort. Heart: Regular rate and rhythm with Normal S1/S2 without  murmurs, rubs or gallops, point of maximum impulse non-displaced  Abdomen: Soft, non-tender or non-distended without rigidity or guarding and positive bowel sounds all four quadrants. Extremities: No clubbing, cyanosis, or edema bilaterally. Full range of motion without deformity and normal gait intact. Skin: Skin color, texture, turgor normal.   Neurologic: Alert and oriented X 3, slight right-sided weakness  Mental status: Alert, oriented, thought content appropriate. Data    Recent Labs     07/06/21  1725 07/08/21  0608 07/09/21  0530   WBC 13.0* 9.6 17.6*   HGB 11.8* 12.6 11.2*   HCT 35.1* 37.3 33.3*    346 318      Recent Labs     07/06/21  1725 07/08/21  0608 07/09/21  0530    136 139   K 3.6 4.0 4.3    100 103   CO2 23 26 25   BUN 23* 9 16   CREATININE 1.1 0.8 0.8     No results for input(s): AST, ALT, ALB, BILIDIR, BILITOT, ALKPHOS in the last 72 hours. Recent Labs     07/08/21  0608 07/08/21  0823   INR 0.96 1.00     No results for input(s): CKTOTAL, CKMB, CKMBINDEX, TROPONINI in the last 72 hours.     Consults:     IP CONSULT TO NEUROSURGERY  IP CONSULT TO ONCOLOGY  IP CONSULT TO RADIATION ONCOLOGY  PHARMACY TO CHANGE BASE FLUIDS    Active Hospital Problems    Diagnosis Date Noted    Brain mass [G93.89] 07/06/2021         ASSESSMENT AND PLAN      Brain mass with right sided weakness:  Status post brain biopsy 7/8/2021  On Keppra and Decadron  Neurochecks every 4  Can follow-up with neurosurgery as outpatient    We will follow recommendations of medical oncology and

## 2021-07-09 NOTE — PROGRESS NOTES
Physician Progress Note      PATIENT:               Marjan Gamboa  CSN #:                  [id number]  :                       1950  ADMIT DATE:       2021 12:40 AM  DISCH DATE:  RESPONDING  PROVIDER #:        Terry Green MD          QUERY TEXT:    Dear Provider,    Patient with right sided weakness,found to have malignant neoplasm of the   brain. If possible, please document in progress notes and discharge summary if   you are evaluating and/or treating any of the following: The medical record reflects the following:  Risk Factors:  CT shown  mass in her left parietal lobe with several core   biopsies with frozen path c/w high grade glioma  Clinical Indicators:  Pt presents with persistent right sided weakness,   numbness and lethargy. Treatment: Decadron , Brain biopsy with surgical path sent , inpatient consult   for radiation oncology, Ambulate w/Assistance  Options provided:  -- Right sided hemiplegia  -- Other - I will add my own diagnosis  -- Disagree - Not applicable / Not valid  -- Disagree - Clinically unable to determine / Unknown  -- Refer to Clinical Documentation Reviewer    PROVIDER RESPONSE TEXT:    This patient has right sided hemiplegia.     Query created by: Dafne Horton on 2021 11:31 AM      Electronically signed by:  Terry Green MD 2021 5:26 PM

## 2021-07-10 VITALS
TEMPERATURE: 97.7 F | DIASTOLIC BLOOD PRESSURE: 76 MMHG | HEART RATE: 57 BPM | BODY MASS INDEX: 34.91 KG/M2 | OXYGEN SATURATION: 96 % | SYSTOLIC BLOOD PRESSURE: 117 MMHG | WEIGHT: 197 LBS | RESPIRATION RATE: 16 BRPM | HEIGHT: 63 IN

## 2021-07-10 LAB
ESTIMATED AVERAGE GLUCOSE: 111.2 MG/DL
GLUCOSE BLD-MCNC: 129 MG/DL (ref 70–99)
GLUCOSE BLD-MCNC: 132 MG/DL (ref 70–99)
HBA1C MFR BLD: 5.5 %
PERFORMED ON: ABNORMAL
PERFORMED ON: ABNORMAL

## 2021-07-10 PROCEDURE — 6370000000 HC RX 637 (ALT 250 FOR IP): Performed by: NEUROLOGICAL SURGERY

## 2021-07-10 PROCEDURE — 2580000003 HC RX 258: Performed by: NURSE PRACTITIONER

## 2021-07-10 PROCEDURE — 97530 THERAPEUTIC ACTIVITIES: CPT

## 2021-07-10 PROCEDURE — 97116 GAIT TRAINING THERAPY: CPT

## 2021-07-10 PROCEDURE — 6370000000 HC RX 637 (ALT 250 FOR IP): Performed by: NURSE PRACTITIONER

## 2021-07-10 PROCEDURE — 97110 THERAPEUTIC EXERCISES: CPT

## 2021-07-10 PROCEDURE — 97535 SELF CARE MNGMENT TRAINING: CPT

## 2021-07-10 PROCEDURE — 6360000002 HC RX W HCPCS: Performed by: NURSE PRACTITIONER

## 2021-07-10 PROCEDURE — 6360000002 HC RX W HCPCS: Performed by: NEUROLOGICAL SURGERY

## 2021-07-10 RX ORDER — PANTOPRAZOLE SODIUM 40 MG/1
40 TABLET, DELAYED RELEASE ORAL
Qty: 30 TABLET | Refills: 3 | Status: SHIPPED | OUTPATIENT
Start: 2021-07-11

## 2021-07-10 RX ORDER — LEVETIRACETAM 500 MG/1
500 TABLET ORAL 2 TIMES DAILY
Qty: 60 TABLET | Refills: 3 | Status: SHIPPED | OUTPATIENT
Start: 2021-07-10

## 2021-07-10 RX ORDER — DEXAMETHASONE 4 MG/1
4 TABLET ORAL 4 TIMES DAILY
Qty: 120 TABLET | Refills: 1 | Status: SHIPPED | OUTPATIENT
Start: 2021-07-10 | End: 2021-08-09

## 2021-07-10 RX ADMIN — DEXAMETHASONE 4 MG: 4 TABLET ORAL at 11:12

## 2021-07-10 RX ADMIN — PANTOPRAZOLE SODIUM 40 MG: 40 TABLET, DELAYED RELEASE ORAL at 05:22

## 2021-07-10 RX ADMIN — HEPARIN SODIUM 5000 UNITS: 5000 INJECTION INTRAVENOUS; SUBCUTANEOUS at 05:22

## 2021-07-10 RX ADMIN — DEXAMETHASONE 4 MG: 4 TABLET ORAL at 05:22

## 2021-07-10 RX ADMIN — LOSARTAN POTASSIUM 100 MG: 25 TABLET, FILM COATED ORAL at 09:39

## 2021-07-10 RX ADMIN — AMLODIPINE BESYLATE 10 MG: 10 TABLET ORAL at 09:39

## 2021-07-10 RX ADMIN — CEFAZOLIN SODIUM 2000 MG: 1 POWDER, FOR SOLUTION INTRAMUSCULAR; INTRAVENOUS at 04:18

## 2021-07-10 RX ADMIN — DOCUSATE SODIUM 50 MG AND SENNOSIDES 8.6 MG 1 TABLET: 8.6; 5 TABLET, FILM COATED ORAL at 09:39

## 2021-07-10 RX ADMIN — POLYETHYLENE GLYCOL 3350 17 G: 17 POWDER, FOR SOLUTION ORAL at 09:39

## 2021-07-10 RX ADMIN — LEVOTHYROXINE SODIUM 25 MCG: 0.03 TABLET ORAL at 05:22

## 2021-07-10 RX ADMIN — LEVETIRACETAM 500 MG: 500 TABLET ORAL at 09:38

## 2021-07-10 ASSESSMENT — PAIN SCALES - GENERAL: PAINLEVEL_OUTOF10: 0

## 2021-07-10 NOTE — DISCHARGE SUMMARY
Hospital Medicine Discharge Summary      Patient ID: Viviana Flores      Patient's PCP: No primary care provider on file. Admit Date: 7/7/2021     Discharge Date: 7/10/2021      Admitting Physician: Jaylen Dale MD    Discharge Physician: Lola Carmona MD     Discharge Diagnoses: Active Hospital Problems    Diagnosis Date Noted    Brain mass [G93.89] 07/06/2021         The patient was seen and examined on day of discharge and this discharge summary is in conjunction with any daily progress note from day of discharge. Hospital Course:     Patient is a 44-year-old female who presented for evaluation of right-sided weakness. She was found to have brain mass. Underwent brain MRI and CT chest/A/P. Work-up was concerning for primary CNS malignancy. Underwent brain mass biopsy 7/8/2021. Pathology sample was sent to [facility]. Oncology and radiation oncology were consulted. Patient treated with steroids, Keppra with improvement in right-sided weakness. She will follow up with oncology and radiation oncology in 1 week to determine plan of treatment. She was cleared by PT OT for discharge to home setting. Consults:     IP CONSULT TO NEUROSURGERY  IP CONSULT TO ONCOLOGY  IP CONSULT TO RADIATION ONCOLOGY  PHARMACY TO CHANGE BASE FLUIDS  IP CONSULT TO SOCIAL WORK  IP CONSULT TO HOME CARE NEEDS    Disposition: Home    Discharged Condition: Stable    Code Status: Prior    Activity: activity as tolerated    Diet: cardiac diet    Follow Up: Primary Care Physician in one week    Exam:     General appearance: No apparent distress, appears stated age and cooperative. Lungs: Clear to ascultation, bilaterally without Rales/Wheezes/Rhonchi with good respiratory effort.   Heart: Regular rate and rhythm with Normal S1/S2 without  murmurs, rubs or gallops, point of maximum impulse non-displaced  Abdomen: Soft, non-tender or non-distended without rigidity or guarding and positive bowel sounds all four quadrants. Extremities: No clubbing, cyanosis, or edema bilaterally. Skin: Skin color, texture, turgor normal.  Neurologic: Alert and oriented X 3, slight right sided weakness  Mental status: Alert, oriented, thought content appropriate      Labs: For convenience and continuity at follow-up the following most recent labs are provided:    CBC:   Lab Results   Component Value Date    WBC 17.6 07/09/2021    HGB 11.2 07/09/2021    HCT 33.3 07/09/2021     07/09/2021       RENAL:   Lab Results   Component Value Date     07/09/2021    K 4.3 07/09/2021    K 4.0 07/08/2021     07/09/2021    CO2 25 07/09/2021    BUN 16 07/09/2021    CREATININE 0.8 07/09/2021           Discharge Medications:   Discharge Medication List as of 7/10/2021 12:28 PM           Details   levETIRAcetam (KEPPRA) 500 MG tablet Take 1 tablet by mouth 2 times daily, Disp-60 tablet, R-3Print      dexamethasone (DECADRON) 4 MG tablet Take 1 tablet by mouth 4 times daily, Disp-120 tablet, R-1Print      pantoprazole (PROTONIX) 40 MG tablet Take 1 tablet by mouth every morning (before breakfast) While on steroids, Disp-30 tablet, R-3Print              Details   amLODIPine (NORVASC) 10 MG tablet Take 10 mg by mouth dailyHistorical Med      losartan (COZAAR) 100 MG tablet Take 100 mg by mouth dailyHistorical Med      levothyroxine (SYNTHROID) 25 MCG tablet Take 25 mcg by mouth DailyHistorical Med      MULTIPLE VITAMIN PO Take 1 tablet by mouth daily Historical Med                Time Spent on discharge is more than 30 minutes in the examination, evaluation, counseling and review of medications and discharge plan. Signed:  Gasper Adamson MD   7/10/2021      Thank you No primary care provider on file. for the opportunity to be involved in this patient's care. If you have any questions or concerns please feel free to contact me at [phone].

## 2021-07-10 NOTE — PLAN OF CARE
Problem: Falls - Risk of:  Goal: Will remain free from falls  Description: Will remain free from falls  7/10/2021 0812 by Ariadna Khan RN  Outcome: Ongoing  Note: Patient alert and oriented X3, non-skid socks on, bed in lowest position and locked, side rails up X2, call light and belongings within reach, bed alarm on for safety, and fall sign posted. Will continue to monitor.

## 2021-07-10 NOTE — PROGRESS NOTES
ONCOLOGY HEMATOLOGY CARE  PROGRESS NOTE    SUBJECTIVE:       Looking forward to being discharged      PHYSICAL EXAM:       /76   Pulse 57   Temp 97.7 °F (36.5 °C) (Oral)   Resp 16   Ht 5' 3\" (1.6 m)   Wt 197 lb (89.4 kg)   SpO2 96%   BMI 34.90 kg/m²     General appearance: Alert and cooperative. Appears stated age. Comfortable. Neuro:  Alert and oriented. Speech is normal.  .  Cranial nerves are intact. Our conversation was appropriate.       MEDS:     Current Facility-Administered Medications   Medication Dose Route Frequency Provider Last Rate Last Admin    insulin lispro (1 Unit Dial) 0-12 Units  0-12 Units Subcutaneous TID WC Santos Alcaraz MD   2 Units at 07/09/21 1744    insulin lispro (1 Unit Dial) 0-6 Units  0-6 Units Subcutaneous Nightly Santos Alcaraz MD   1 Units at 07/09/21 2034    dexamethasone (DECADRON) tablet 4 mg  4 mg Oral 4 times per day BLAYNE Shea - CNP   4 mg at 07/10/21 1112    levETIRAcetam (KEPPRA) tablet 500 mg  500 mg Oral BID BLAYNE Infante - CNP   500 mg at 07/10/21 8582    ceFAZolin (ANCEF) 2,000 mg in sodium chloride 0.9 % 50 mL IVPB  2,000 mg Intravenous Q8H BLAYNE Infante - CNP   Stopped at 07/10/21 0450    sodium chloride flush 0.9 % injection 10 mL  10 mL Intravenous 2 times per day Val Barrett MD   10 mL at 07/09/21 2034    sodium chloride flush 0.9 % injection 10 mL  10 mL Intravenous PRN Val Barrett MD        0.9 % sodium chloride infusion  25 mL Intravenous PRN Val Barrett MD        naloxone [facility]) injection 0.2 mg  0.2 mg Intravenous PRN Vla Barrtet MD        aluminum & magnesium hydroxide-simethicone (MAALOX) 200-200-20 MG/5ML suspension 15 mL  15 mL Oral Q6H PRN Val Barrett MD        polyethylene glycol (GLYCOLAX) packet 17 g  17 g Oral Daily Val Barrett MD   17 g at 07/10/21 0939    sennosides-docusate sodium (SENOKOT-S) 8.6-50 MG tablet 1 tablet  1 tablet Oral BID Val Barrett MD   1 tablet at 07/10/21 0939    bisacodyl (DULCOLAX) suppository 10 mg  10 mg Rectal Daily PRN Annamaria Moore MD        promethazine (PHENERGAN) tablet 12.5 mg  12.5 mg Oral Q6H PRN Annamaria Moore MD        Or    ondansetron [facility]) injection 4 mg  4 mg Intravenous Q6H PRN Annamaria Moore MD        acetaminophen (TYLENOL) tablet 650 mg  650 mg Oral Q4H PRN Annamaria Moore MD        traMADol Ancelmo Maricruz) tablet 50 mg  50 mg Oral Q6H PRN Annamaria Moore MD        Or    traMADol Ancelmo Alcantara) tablet 100 mg  100 mg Oral Q6H PRN Annamaria Moore MD        heparin (porcine) injection 5,000 Units  5,000 Units Subcutaneous Q8H Annamaria Moore MD   5,000 Units at 07/10/21 0522    glucose (GLUTOSE) 40 % oral gel 15 g  15 g Oral PRN Annamaria Moore MD        dextrose 50 % IV solution  12.5 g Intravenous PRN Annamaria Moore MD        glucagon (rDNA) injection 1 mg  1 mg Intramuscular PRN Annamaria Moore MD        dextrose 5 % solution  100 mL/hr Intravenous PRN Annamaria Moore MD        pantoprazole (PROTONIX) tablet 40 mg  40 mg Oral QAM AC BLAYNE Ho NP   40 mg at 07/10/21 0522    amLODIPine (NORVASC) tablet 10 mg  10 mg Oral Daily Annamaria Moore MD   10 mg at 07/10/21 2153    levothyroxine (SYNTHROID) tablet 25 mcg  25 mcg Oral Daily Annamaria Moore MD   25 mcg at 07/10/21 0522    losartan (COZAAR) tablet 100 mg  100 mg Oral Daily Annamaria Moore MD   100 mg at 07/10/21 0939       LABS:     CBC:   Lab Results   Component Value Date    WBC 17.6 (H) 07/09/2021    HGB 11.2 (L) 07/09/2021    HCT 33.3 (L) 07/09/2021    MCV 81.8 07/09/2021     07/09/2021    LYMPHOPCT 11.6 07/08/2021    RBC 4.07 07/09/2021    MCH 27.5 07/09/2021    MCHC 33.6 07/09/2021    RDW 14.8 07/09/2021    NEUTOPHILPCT 87.4 07/08/2021    MONOPCT 1.0 07/08/2021    BASOPCT 0.0 07/08/2021    NEUTROABS 8.4 (H) 07/08/2021    LYMPHSABS 1.1 07/08/2021    MONOSABS 0.1 07/08/2021    EOSABS 0.0 07/08/2021    BASOSABS 0.0 07/08/2021       CMP:   Lab Results   Component Value Date    NA 139 2021    K 4.3 2021    K 4.0 2021     2021    CO2 25 2021    BUN 16 2021    CREATININE 0.8 2021    GLUCOSE 165 2021    CALCIUM 9.5 2021    PROT 8.3 2020    LABALBU 4.8 2020    BILITOT 0.3 2020    ALKPHOS 94 2020    AST 22 2020    ALT 16 2020      PATHOLOGY   [id number]    Age Sex:   1950    70 Y / F      Location:      [id number]   Account No:   [de-identified]                 Collected:     2021   Med Rec No:    [id number]                Received:      2021   Attend Phys:   Puja Crane MD             Completed:     2021   Perform Phys: Ehsan Hart MD             FINAL DIAGNOSIS:     A, B: Left frontal brain lesion, stereotactic core biopsies:      - Pending expert consultation; see Comment. COMMENT:   An H&E-stained slide on each block, and FFPE block (B1), have   been requested to be sent to the [facility] Department of Pathology,   Neuropathology Division, for expert consultation. Following receipt of   that consultative report, an addendum SJS report will be generated. Please call the laboratory (triage desk  at [phone])   or Dr. Livier Phan (cell [phone]) with any questions, comments, or   concerns.   BRATI/BRATI     IMAGING:     None in past 24 hrs    ASSESSMENT:         Patient Active Problem List   Diagnosis Code    SOB (shortness of breath) R06.02    Asthma J45.909    Sleep apnea G47.30    Chronic cough R05    Brain mass G93.89     Final path is pending.     PLAN:       She tells me she lives near [facility].  Will make sure our office/Dr. James Antoine makes arrangement for followup for her with Dr. James Antoine to coordinate medical therapy along with radiation    Chen Moscoso MD

## 2021-07-10 NOTE — PROGRESS NOTES
Occupational Therapy  Facility/Department: [facility] 5T ORTHO/NEURO  Daily Treatment Note  NAME: Reese Price  : 1950  MRN: [id number]    Date of Service: 7/10/2021    Discharge Recommendations:  Reese Price scored a 21/24 on the AM-PAC ADL Inpatient form. Current research shows that an AM-PAC score of 18 or greater is typically associated with a discharge to the patient's home setting. Based on the patient's AM-PAC score, and their current ADL deficits, it is recommended that the patient have 2-3 sessions per week of Occupational Therapy at d/c to increase the patient's independence. At this time, this patient demonstrates the endurance and safety to discharge home with OP OT and a follow up treatment frequency of 2-3x/wk. Please see assessment section for further patient specific details. If patient discharges prior to next session this note will serve as a discharge summary. Please see below for the latest assessment towards goals. OT Equipment Recommendations  ADL Assistive Devices: Shower Chair with back    Assessment   Performance deficits / Impairments: Decreased functional mobility ; Decreased balance;Decreased ADL status; Decreased safe awareness;Decreased high-level IADLs;Decreased endurance;Decreased strength  Assessment: Pt demonstrated increased indep with ADLs and functional transfers. She indep incorporated R UE with all tasks. Pt reports there is always family member with her at home. Recommend discharge to home with 24 hr S and OP OT to increase R UE function and transfers. Treatment Diagnosis: decreased ADLs and RUE strength d/t brain mass  Prognosis: Good  Patient Education: R UE AROM HEP-verbal/written, pt performed with cues/demo  REQUIRES OT FOLLOW UP: Yes  Activity Tolerance  Activity Tolerance: Patient Tolerated treatment well  Safety Devices  Safety Devices in place: Yes  Type of devices: Call light within reach; Chair alarm in place; Left in chair;Nurse notified Patient Diagnosis(es): There were no encounter diagnoses. has a past medical history of Colon cancer ([facility] [address] and High blood pressure. has a past surgical history that includes Breast biopsy; Tubal ligation; Colon surgery; and craniotomy (Left, 7/8/2021). Restrictions  Position Activity Restriction  Other position/activity restrictions: up with A; HOB 30  Subjective   General  Chart Reviewed: Yes  Patient assessed for rehabilitation services?: Yes  Additional Pertinent Hx: 78 y.o. F presents to [facility] with persistent right sided weakness/lethargy. Hospital Course: CT head was done, it was concerning for a mass in her left parietal lobe; Neurosurgery Consult: OR for biopsy of left frontal lesion. S/p CRANIOTOMY STEREOTATIC BIOPSY LEFT FRONTAL on 7/8      PMH: HTN and colon cancer, treated in 2000 with partial colectomy and chemotherapy  Family / Caregiver Present: No  Referring Practitioner: Dr. Sung Miller  Diagnosis: Brain Mass  Subjective  Subjective: Pt in bed, agreeable to work with OT. She reports there is always someone home with her. She has no concerns about discharge to home. General Comment  Comments: Daughter called during session and therapy briefly educated her on pt's current functional level and importance of 24 hr spvn at d/c for safety. Pt's daughter reported family can likely provide 24 hr, however also reported feeling overwhelmed with the situation as she works full time and has 3 children. Pt and daughter report [place] therapy would not be a good option as they have a very large and over-protective dog who does not like strangers.   Vital Signs  Patient Currently in Pain: Denies   Orientation  Orientation  Overall Orientation Status: Within Functional Limits  Objective    ADL  Feeding: Independent  Grooming:  (with cues to avoid brushing scalp/incision area, pt indep brushing hair and putting it in ponytail)  UE Bathing: Independent (with wipes, in stance)  LE Bathing: Independent (cues to sit when washing feet)  UE Dressing: Stand by assistance ([facility] hospital gown in stance)  LE Dressing: Independent (donning B hospital socks, S/SBA in stance when pulling up brief and hospital pants)        Toilet Transfers  Toilet - Technique: Ambulating (witihout A device, SBA/CGA)  Equipment Used: Standard toilet  Toilet Transfer: Stand by assistance  Toilet Transfers Comments: practiced 2x, use of grab bar on R side, has sink at home    Bed mobility  Sit to Supine: Modified independent (head of bed elevated)  Transfers  Sit to stand: Stand by assistance  Stand to sit: Stand by assistance                      Type of ROM/Therapeutic Exercise  Comment: Pt provided with 3 exercises on R UEHEP:  thumb opposition, supination/pronation, reaching body parts with R UE-pt verbalized and demonstrated understanding                    Plan   Plan  Times per week: 5-7x  Times per day: Daily  Current Treatment Recommendations: Strengthening, Safety Education & Training, Balance Training, Patient/Caregiver Education & Training, Self-Care / ADL, Functional Mobility Training, Equipment Evaluation, Education, & procurement, Endurance Training  G-Code     OutComes Score                                                  AM-PAC Score        AM-[facility] Inpatient Daily Activity Raw Score: 21 (07/10/21 0830)  AM-PAC Inpatient ADL T-Scale Score : 44.27 (07/10/21 0830)  ADL Inpatient CMS 0-100% Score: 32.79 (07/10/21 0830)  ADL Inpatient CMS G-Code Modifier : Evelia Aguilar (07/10/21 0830)    Goals  Short term goals  Time Frame for Short term goals: by d/c  Short term goal 1: pt will complete LB dressing Mod I-7/10 goal not met  Short term goal 2: pt will complete toilet transfer w/ spvn-7/10 goal not met  Short term goal 3: pt will complete toileting Mod I-7/10 goal not addressed  Short term goal 4: pt will demo independence w/ RUE HEP to increase strength for ADL completion-7/10 goal partly met  Patient Goals   Patient goals : to return home       Therapy Time   Individual Concurrent Group Co-treatment   Time In 0750         Time Out 0832         Minutes 42           Timed Code Treatment Minutes:  42    Total Treatment Minutes:  [address], OTR/L 94 31 11

## 2021-07-10 NOTE — PROGRESS NOTES
Patient is A/O x4, VSS, and denies pain at this time. Patient ambulates to the bathroom to void and is voiding appropriately. Patient's incision is CHESTER with no signs of drainage. Patient is tolerating PO liquids and diet well. Fall precautions are in place, will continue to monitor and assess patient.

## 2021-07-10 NOTE — PROGRESS NOTES
Neurosurgery Progress Note    Patient seen and examined on 07/10/21. No acute events overnight. Reports no headaches and improved right-sided strength.  Neurologically stable on exam.    A/P: 77 yo woman POD 2 s/p stereotactic biopsy    -Neuro stable  -HOB elevated  -Frequent neuro checks  -Pain control with PO meds  -Keppra for seizure ppx  -Decadron, PPI, SSI  -DVT ppx   -Rad onc / Heme onc following  -Follow up in 2 weeks to re-evaluate wound    Nate Martin MD, PhD  [address], [address] [facility], [address] [phone] (c), [phone] (o)

## 2021-07-10 NOTE — CARE COORDINATION
Case Management            Discharge Note                    Date / Time of Note: 7/10/2021 11:18 AM                  Discharge Note Completed by: ROSEANN Cm    Patient Name: Michael Bright   YOB: 1950  Diagnosis: Brain mass [G93.89]   Date / Time: 7/7/2021 12:40 AM    Current PCP: No primary care provider on file. Clinic patient: No    Hospitalization in the last 30 days: N/a     Advance Directives:  Code Status: Full Code  [place] DNR form completed and on chart: Not Indicated    Financial:  Payor: MEDICARE / Plan: MEDICARE PART A AND B / Product Type: *No Product type* /      Pharmacy:    [facility] [address] [facility], [address] [place] [address] [phone] [address] [phone]  [address]  Phone: [phone] Fax: [phone]      Assistance purchasing medications?: Potential Assistance Purchasing Medications: No  Assistance provided by Case Management: None at this time    Does patient want to participate in local refill/ meds to beds program?: Not Assessed    Meds To Beds General Rules:  1. Can ONLY be done Monday- Friday between 8:30am-5pm  2. Prescription(s) must be in pharmacy by 3pm to be filled same day  3. Copy of patient's insurance/ prescription drug card and patient face sheet must be sent along with the prescription(s)  4. Cost of Rx cannot be added to hospital bill. If financial assistance is needed, please contact unit  or ;  or  CANNOT provide pharmacy voucher for patients co-pays  5.  Patients can then  the prescription on their way out of the hospital at discharge, or pharmacy can deliver to the bedside if staff is available. (payment due at time of pick-up or delivery - cash, check, or card accepted)     Able to afford home medications/ co-pay costs: Yes    ADLS:  Current PT AM-PAC Score: 19 /24  Current OT AM-PAC Score: 21 /24      Discharge Disposition: Home- [facility] How and outpatient therapy     LOC at discharge: Not Applicable    Notification completed in HENS/PAS?:  No    IMM Completed:   No    Transportation:  Transportation Plan for discharge: Daughter    Mode of Transport: [address] ordered at discharge: declined, due to [address]: Not Applicable    Durable Medical Equipment:  DME Provider: [address] obtained during hospitalization: [address] and Respiratory Equipment:  Oxygen needed at discharge?: Not Indicated    Referrals made at [facility] for outpatient continued care:  Not Applicable    Additional CM Notes:   Patient medically ready for discharge. SW delivered a walker form [employer]. Daughter turned down home health care due to dog in the home. Agreeable to outpatient therapy. They are close to [facility] and could do treatment there. LESLY paged Dr. Randi Hooker for orders. Daughter aware and agreeable to plan. The Plan for Transition of Care is related to the following treatment goals Brain mass [G93.89]      The Patient and/or patient representative was provided with a choice of provider and agrees with the discharge plan Yes    Freedom of choice list was provided with basic dialogue that supports the patient's individualized plan of care/goals and shares the quality data associated with the providers.  Yes    Care Transitions patient: No    ROSEANN Syed  [facility] [place] - Weekend Coverage   Case Management Department  Ph: [phone]

## 2021-07-10 NOTE — PROGRESS NOTES
Physical Therapy  Facility/Department: [facility] 5T ORTHO/NEURO  Daily Treatment Note  NAME: Dariana Deal  : 1950  MRN: [id number]    Date of Service: 7/10/2021    Discharge Recommendations:    Dariana Deal scored a 19/24 on the AM-PAC short mobility form. Current research shows that an AM-PAC score of 18 or greater is typically associated with a discharge to the patient's home setting. Based on the patient's AM-PAC score and their current functional mobility deficits, it is recommended that the patient have 2-3 sessions per week of Physical Therapy at d/c to increase the patient's independence. At this time, this patient demonstrates the endurance and safety to discharge home with  (home vs OP services) and a follow up treatment frequency of 2-3x/wk. Please see assessment section for further patient specific details. If patient discharges prior to next session this note will serve as a discharge summary. Please see below for the latest assessment towards goals. Assessment   Assessment: Pt plans on return home with 24 hr A of family, has two walkers, and is doing well with functional mobilty and gait. Pt plans on return home today with family, RW and OPPT. PT Education: Goals;PT Role;Plan of Care; Functional Mobility Training;Gait Training  Patient Education: Pt expresses understanding  Activity Tolerance  Activity Tolerance: Patient Tolerated treatment well     Patient Diagnosis(es): The encounter diagnosis was Brain mass. has a past medical history of Colon cancer ([facility] [address] and High blood pressure. has a past surgical history that includes Breast biopsy; Tubal ligation; Colon surgery; and craniotomy (Left, 2021). Restrictions  Position Activity Restriction  Other position/activity restrictions: up with A; HOB 30  Subjective   Subjective  Subjective: Pt seated in recliner, agreable to working with PT prior to DC anticipated today.      Objective      Transfers  Sit to Stand: Stand by assistance  Stand to sit: Stand by assistance  Ambulation  Ambulation?: Yes  Ambulation 1  Surface: level tile  Device: Rolling Walker  Assistance: Stand by assistance  Quality of Gait: slow and steady gait,  No LOB,  cues to turn and keep all 4 wheels on the ground.   Distance: 200 ft  Stairs/Curb  Stairs?: Yes (up and down 4 steps with right rail to ascend, left to descend with SBA)     Balance  Comments: Good stability with  functional mobility and gait      AM-PAC Score  AM-PAC Inpatient Mobility Raw Score : 19 (07/10/21 1311)  AM-PAC Inpatient T-Scale Score : 45.44 (07/10/21 1311)  Mobility Inpatient CMS 0-100% Score: 41.77 (07/10/21 1311)  Mobility Inpatient CMS G-Code Modifier : CK (07/10/21 1311)          Goals  Short term goals  Time Frame for Short term goals: dc  ongoing 7/10  Short term goal 1: Transfers supervision  Short term goal 2: Ambulate 200' with RW S  Short term goal 3: up/down flight of stairs with rail SBA  Short term goal 4: Demo indep with HEP x 10 reps  Patient Goals   Patient goals : home at [address] per week: 5-7  Current Treatment Recommendations: Balance Training, Functional Mobility Training, Transfer Training, Gait Training, Stair training, Safety Education & Training  Safety Devices  Type of devices: Call light within reach, Chair alarm in place, Nurse notified, Left in chair     Therapy Time   Individual Concurrent Group Co-treatment   Time In [facility]         Minutes 15               Timed Code Treatment Minutes:  15    Total Treatment Minutes:  Maverick Mcneil, [id number]

## 2021-07-10 NOTE — PROGRESS NOTES
Clinical Pharmacy Progress Note    78 y.o. female with head CT with concern for brain mets of L parietal lobe. Pharmacy has been asked by BLAYNE Martinez to adjust all drips to normal saline as appropriate based on compatibility to avoid fluid shifts since D5 is osmotically active. The following intermittent IV drips/infusions have been adjusted to saline:  Cefazolin     Please be aware that patient has D5W ordered as part of hypoglycemia orderset. Total IV fluid delivered to patient over last 24h: 110 mL    RPh will follow daily to ensure all new IVPBs + drips are in NS. Please call with questions.     Nereida Herring, PharmD, BCCCP  Wireless: [phone]  7/10/2021 1:13 PM

## 2021-08-18 ENCOUNTER — HOSPITAL ENCOUNTER (OUTPATIENT)
Dept: PHYSICAL THERAPY | Age: 71
Setting detail: THERAPIES SERIES
Discharge: HOME | End: 2021-08-18
Payer: MEDICARE

## 2021-08-18 ENCOUNTER — HOSPITAL ENCOUNTER (OUTPATIENT)
Dept: OCCUPATIONAL THERAPY | Age: 71
Setting detail: THERAPIES SERIES
Discharge: HOME | End: 2021-08-18
Payer: MEDICARE

## 2021-08-18 PROCEDURE — 97110 THERAPEUTIC EXERCISES: CPT

## 2021-08-18 PROCEDURE — 97161 PT EVAL LOW COMPLEX 20 MIN: CPT

## 2021-08-18 PROCEDURE — 97166 OT EVAL MOD COMPLEX 45 MIN: CPT

## 2021-08-18 ASSESSMENT — 9 HOLE PEG TEST
TESTTIME_SECONDS: 34.78
TESTTIME_SECONDS: 27.35
TEST_RESULT: FUNCTIONAL
TEST_RESULT: IMPAIRED

## 2021-08-18 NOTE — PROGRESS NOTES
Physical Therapy  Initial Assessment/Treatment Session  Date: 2021  Patient Name: René Sanchez  MRN: [id number]  : 1950          Restrictions  Restrictions/Precautions  Restrictions/Precautions: Fall Risk    Subjective   General  Chart Reviewed: Yes  Patient assessed for rehabilitation services?: Yes  Additional Pertinent Hx: Per Dr. Zepeda Poster, 7/10, \"Patient is a 81-year-old female who presented for evaluation of right-sided weakness. She was found to have brain mass. Underwent brain MRI and CT chest/A/P. Work-up was concerning for primary CNS malignancy. Underwent brain mass biopsy 2021. Pathology sample was sent to [facility]. Oncology and radiation oncology were consulted. Patient treated with steroids, Keppra with improvement in right-sided weakness. She will follow up with oncology and radiation oncology in 1 week to determine plan of treatment. She was cleared by PT OT for discharge to home setting. \"  Response To Previous Treatment: Not applicable  Referring Practitioner: Dr. Cindy Roldan  Referral Date : 21  Diagnosis: G93.89 (ICD-10-CM) - Brain mass  Other (Comment): Able to follow simple commands with increased cueing. PT Visit Information  Onset Date: 21  PT Insurance Information: [facility]  Total # of Visits Approved: 6 (Through 10/17/2021)  Total # of Visits to Date: 0  Subjective  Subjective: Pt pleasant and agreeable to evaluation. Vision/Hearing  Vision  Vision: Impaired (Needs reading glasses)  Hearing  Hearing: Within functional limits    Orientation  Orientation  Overall Orientation Status: Within Functional Limits    Social/Functional History  Social/Functional History  Lives With: Daughter;Son (grandkids; son is slow learner but she doesn't have to physically help him)  Type of Home: House  Home Layout: Two level;Bed/Bath upstairs; Able to Live on Main level with bedroom/bathroom  Home Access: Stairs to enter without rails  Entrance Stairs - Number of Steps: 1  Bathroom Shower/Tub: Tub/Shower unit  Bathroom Toilet: Standard  Bathroom Equipment: 3-in-1 commode  Home Equipment: Rolling walker (wc is coming)  ADL Assistance:  (Pt requires assist for sponge bathing, assist for toilet transfer, assist for dressing. Able to feed herself.)  Homemaking Assistance:  (she does own laundry, cooking; dtr does grocery shopping)  Ambulation Assistance: Needs assistance (Limited to very short distances with walker. Needs assist x 1.)  Transfer Assistance: Needs assistance (Needs assist for toilet transfer)  Active : No  Occupation: Retired  Additional Comments: At least 5 falls d/t weakness since July. Objective    AROM RLE (degrees)  RLE AROM: Exceptions  RLE General AROM: Hip Flex moderately limited, ABD mildly limited, Knee Flex/Ext WFL. Ankle PF/DF WFL. AROM LLE (degrees)  LLE AROM : WNL  LLE General AROM: Hip Flex, Knee Flex/Ext, and Ankle PF/DF WNL  AROM RUE (degrees)  RUE AROM : WFL  AROM LUE (degrees)  LUE AROM : WFL    Strength RLE  Strength RLE: Exception  R Hip Flexion: 2/5  R Hip ABduction: 2/5  R Knee Flexion: 3/5  R Knee Extension: 3+/5  R Ankle Dorsiflexion: 3+/5  R Ankle Plantar flexion: 2/5    Strength LLE  L Hip Flexion: 3+/5  L Hip ABduction: 2+/5  L Knee Flexion: 3+/5  L Knee Extension: 4/5  L Ankle Dorsiflexion: 3+/5  L Ankle Plantar Flexion: 3+/5    Strength RUE  Strength RUE: WFL  Strength LUE  Strength LUE: WFL    Tone RLE  RLE Tone: Normotonic  Motor Control  Gross Motor?: WFL     Bed mobility  Supine to Sit: Moderate assistance  Sit to Supine: Contact guard assistance     Transfers  Sit to Stand: Moderate Assistance  Stand to sit: Moderate Assistance  Bed to Chair: Moderate assistance     Ambulation  Ambulation?: Yes  Ambulation 1  Surface: level tile  Device: Rolling Walker  Assistance: Contact guard assistance  Quality of Gait: Slow speed, mild knee buckling, step-through pattern, limited d/t fatigue.   Distance: 25'  Stairs/Curb  Stairs?: No     Exercises  Exercise 1: Supine:  RLE heel slide x 10. Exercise 2: Supine: R hip flex AROM x 10. Exercise 3: Supine: R hip ABD AROM x 10, cues for technique. Exercise 4: PT provided pt with written HEP, and reviewed exercises/ambulation guidelines (sit < > stand x 10, supine LE exercises 3 x 10, ambulation 25', 3x/day). Assessment   Conditions Requiring Skilled Therapeutic Intervention  Body structures, Functions, Activity limitations: Decreased functional mobility ; Decreased strength;Decreased ROM; Decreased endurance;Decreased balance;Decreased ADL status; Decreased safe awareness  Assessment: Pt is a 73 y.o. F. with recent diagnosis of multiple glioblastomas, beginning OP PT d/t R-sided weakness, hx of falls. She presents pleasant and agreeable to evaluation, mildly confused, demonstrating significantly decreased RLE strength. She required Mod A for transfers, but did well to ambulate 25' with walker, CGA. She would benefit from 6-8 weeks of continued therapy to improve her strength, balance, ROM, endurance, and independence transferring/ambulating. Will continue to assess for DME needs. Prognosis: Poor  Decision Making: Low Complexity  History: See below  Exam: Strength; ROM; Balance; Ambulation  Clinical Presentation: Stable  Barriers to Learning: Fatigue; Confusion  REQUIRES PT FOLLOW UP: Yes  Discharge Recommendations: Patient would benefit from continued therapy after discharge; Outpatient PT  Activity Tolerance  Activity Tolerance: Patient limited by endurance; Patient limited by fatigue         Plan   Plan  Times per week: 1-2x  Plan weeks: 6-8 weeks  Specific instructions for Next Treatment: Improve endurance, strength; ambulate as able  Current Treatment Recommendations: Strengthening, ROM, IADL Training, Balance Training, Functional Mobility Training, Transfer Training, Gait Training, Stair training, Equipment Evaluation, Education, & procurement, Patient/Caregiver problem for which you are currently seeking attention. Please provide an answer for each activity. Today would you have difficulty at all with:    Activities Extreme Difficulty or Unable to Perform Activity Quite a Bit of Difficulty Moderate Difficulty A Little Bit of Difficulty No Difficulty   1 Any of your usual work, housework, or school activities [x]0 []1 []2  []3 []4   2 Your usual hobbies, recreational, or sporting activities [x]0 []1 []2 []3 []4   3 Getting into or out of the bath [x]0 []1 []2 []3 []4   4 Walking between rooms []0 []1 [x]2 []3 []4   5 Putting on your shoes or socks []0 []1 [x]2 []3 []4   6 Squatting [x]0 []1 []2 []3 []4   7 Lifting an object, like a bag of groceries from the floor [x]0 []1 []2 []3 []4   8 Performing light activities around your home [x]0 []1 []2 []3 []4   9 Performing heavy activities around your home [x]0 []1 []2 []3 []4   10 Getting into or out of a car []0 []1 [x]2 []3 []4   11 Walking 2 blocks [x]0 []1 []2 []3 []4   12 Walking a mile [x]0 []1 []2 []3 []4   13 Going up or down 10 stairs (about 1 flight of stairs) [x]0 []1 []2 []3 []4   14 Standing for 1 hour [x]0 []1 []2 []3 []4   15 Sitting for 1 hour []0 []1 []2 []3 [x]4   16 Running on even ground  [x]0 []1 []2 []3 []4   17 Running on uneven ground  [x]0 []1 []2 []3 []4   18 Making sharp turns while running fast  [x]0 []1 []2 []3 []4   19 Hopping [x]0 []1 []2 []3 []4   20 Rolling over in bed []0 []1 []2 []3 [x]4    Column Totals:            Patient Score from Above: 14    (Patient Score / 80) X 100:  17.5%                          Scoring Method for Lower Extremity Functional Scale    The Lower Extremity Functional Scale (LEFS) is an easily administered and scored functional outcome tool. It can be utilized for lower extremity conditions and is sensitive enough for a wide range of functional disability levels.   It can and should be used on the initial visit and subsequently on a 2- 3 week basis to measure

## 2021-08-18 NOTE — PROGRESS NOTES
Outpatient Physical Therapy  [] [place]    Phone: [phone]   Fax: [phone]   [x] [facility]  Phone: [phone]              Fax: [phone]  [] [facility]   Phone: [phone]   Fax: [phone]     To: Referring Practitioner: Dr. Syed Chandler      Patient: Dena Norman   : 1950   MRN: [id number]  Evaluation Date: 2021      Diagnosis Information:  · Diagnosis: G93.89 (ICD-10-CM) - Brain mass   ·       Physical Therapy Certification/Re-Certification Form  Dear Dr. Jeanine Hinkle,  The following patient has been evaluated for physical therapy services and for therapy to continue, Medicare requires monthly physician review of the treatment plan. Please review the attached evaluation and/or summary of the patient's plan of care, and verify that you agree therapy should continue by signing the attached document and sending it back to our office. Plan of Care/Treatment to date:  [x] Therapeutic Exercise    [x] Modalities:  [x] Therapeutic Activity     [] Ultrasound  [] Electrical Stimulation  [x] Gait Training      [] Cervical Traction [] Lumbar Traction  [x] Neuromuscular Re-education    [] Cold/hotpack [] Iontophoresis   [x] Instruction in HEP     Other:  [x] Manual Therapy      []             [] Aquatic Therapy      []           ? Frequency/Duration:  # Days per week: [x] 1 day # Weeks: [] 1 week [] 5 weeks     [] 2 days? [] 2 weeks [] 6 weeks     [] 3 days   [] 3 weeks [] 7 weeks     [] 4 days   [] 4 weeks [x] 8 weeks    Rehab Potential: [] Excellent [] Good [x] Fair  [] Poor       Electronically signed by:  Sydney Storm PT   Electronically signed by Sydney Storm PT [id number] on 2021 at 4:11 PM        If you have any questions or concerns, please don't hesitate to call.   Thank you for your referral.      Physician Signature:________________________________Date:__________________  By signing above, therapists plan is approved by physician

## 2021-08-19 ASSESSMENT — 9 HOLE PEG TEST
TEST_RESULT: IMPAIRED
TEST_RESULT: FUNCTIONAL
TESTTIME_SECONDS: 34.78

## 2021-08-19 NOTE — PROGRESS NOTES
Occupational Therapy  Occupational Therapy Initial Assessment  Date:  2021    Patient Name: Ankur Moralez  MRN: [id number]     :  1950          Restrictions  Restrictions/Precautions  Restrictions/Precautions: Fall Risk  Subjective   General  Chart Reviewed: Yes  Patient assessed for rehabilitation services?: Yes  Additional Pertinent Hx: Patient is a 62-year-old female who presented for evaluation of right-sided weakness. She was found to have brain mass. Underwent brain MRI and CT chest/A/P. Work-up was concerning for primary CNS malignancy. Underwent brain mass biopsy 2021. [facility]ology sample was [facility] to [facility]. Oncology and radiation oncology were consulted. Patient treated with steroids, Keppra with improvement in right-sided weakness. Family / Caregiver Present: Yes Trinidad Charles)  Referring Practitioner: Magdalena Robles MD  Diagnosis: T26.63 (ICD-10-CM) - Brain mass  OT Visit Information  Onset Date: 21  OT Insurance Information: MEDICARE  Total # of Visits Approved: 6 (APPROVED FOR 6 VISITS(24 UNITS) FROM 21 THRU 10/17/21 AUTH# [id number])  Subjective  Subjective: Pt reports no pain. Pt reports R sided weakness. Home Living  Social/Functional History  Lives With: Daughter, Son (grandkids; son is slow learner but she doesn't have to physically help him)  Type of Home: House  Home Layout: Two level, Bed/Bath upstairs, Able to Live on Main level with bedroom/bathroom  Home Access: Stairs to enter without rails  Entrance Stairs - Number of Steps: 1  Bathroom Shower/Tub: Tub/Shower unit  Bathroom Toilet: Standard  Bathroom Equipment: 3-in-1 commode  Home Equipment: Rolling walker (wc is coming)  ADL Assistance:  (Pt requires assist for sponge bathing, assist for toilet transfer, assist for dressing.   Able to feed herself.)  Homemaking Assistance:  (she does own laundry, cooking; dtr does grocery shopping)  Ambulation Assistance: Needs assistance (Limited to very short distances with walker. Needs assist x 1.)  Transfer Assistance: Needs assistance (Needs assist for toilet transfer)  Active : No  Occupation: Retired  Additional Comments: At least 5 falls d/t weakness since July. Objective      ADL  Feeding: Independent  Grooming: Setup  UE Bathing: Setup (seated)  LE Bathing: Minimal assistance; Moderate assistance  UE Dressing: Setup (seated)  LE Dressing: Minimal assistance; Moderate assistance  Toileting:  Moderate assistance (assist for balance)  Additional Comments: assist from family  Tone RUE  RUE Tone: Normotonic  Tone LUE  LUE Tone: Normotonic  Coordination  Movements Are Fluid And Coordinated: Yes     Balance  Sitting Balance: Stand by assistance  Standing Balance: Contact guard assistance (RW)  Bed mobility  Supine to Sit: Moderate assistance  Sit to Supine: Contact guard assistance  Transfers  Sit to stand: Minimal assistance  Stand to sit: Minimal assistance     Cognition  Overall Cognitive Status: WFL     Sensation  Overall Sensation Status:  (reports intermittent numbness in hands)           LUE AROM (degrees)  LUE AROM : WFL  Left Hand AROM (degrees)  Left Hand AROM: WFL  RUE AROM (degrees)  RUE AROM : WFL  Right Hand AROM (degrees)  Right Hand AROM: WFL  LUE Strength  Gross LUE Strength: WFL  L Shoulder Flex: 5/5  L Shoulder Ext: 5/5  L Shoulder ABduction: 5/5  L Shoulder ADduction: 5/5  L Elbow Flex: 5/5  L Elbow Ext: 4+/5  L Hand General: 5/5  RUE Strength  Gross RUE Strength: Exceptions to [facility]  R Shoulder Flex: 4/5  R Shoulder Ext: 4/5  R Shoulder ABduction: 4/5  R Shoulder ADduction: 4/5  R Elbow Flex: 4/5  R Elbow Ext: 4/5  R Hand General: 4/5     Hand Dominance  Hand Dominance: Right  Left Hand Strength -  (lbs)  Handle Setting 2: 30, 31, 32  Left 9-Hole Peg Test  Left 9-Hole Peg Test: Functional  Right Hand Strength -  (lbs)  Handle Setting 2: 20, 20, 19  Right 9-Hole Peg Test  Right 9-Hole Peg Test: Impaired  Fine Motor Skills  Left 9-Hole goals  Time Frame for Short term goals: 4-6 weeks  Short term goal 1: Pt will be independent with HEP and report completing daily  Short term goal 2: Pt will increase R UE strength to 4+/5 for increased ability to complete ADLs  Short term goal 3: Pt will report completing all ADLs Ind for increased independence with mourning routine  Short term goal 4: Pt will increase R hand  strength by 5# for increased ability to complete home tasks  Short term goal 5: Pt will decrease R hand 9 hole peg test by 5 seconds for increased coordination with ADLs  Patient Goals   Patient goals : increase strength and independence       Therapy Time   Individual Concurrent Group Co-treatment   Time In 1430         Time Out 1515         Minutes 45         Timed Code Treatment Minutes: 30 Minutes (15 minute eval)       Farheen Velez OTR/L

## 2021-08-19 NOTE — PROGRESS NOTES
Occupational Therapy       Outpatient Occupational Therapy  [] [facility] [address]   Phone: [phone]   Fax: [phone]   [x] [facility]  Phone: [phone]   Fax: [phone]  [] [place]   Phone: [phone]   Fax: [phone]      To: Referring Practitioner: Aniyah Eason MD      Patient: Mag Kaye  : 1950  MRN: [id number]  Evaluation Date: 2021      Diagnosis Information:  Diagnosis: G93.89 (ICD-10-CM) - Brain mass   OT Insurance Information: [facility]  Dear Dr. Mira Garay,   The following patient has been evaluated for occupational therapy services and for therapy to continue, Medicare requires monthly physician review of the treatment plan. Please review the attached evaluation and/or summary of the patient's plan of care, and verify that you agree therapy should continue by signing the attached document and sending it back to our office. Plan of Care/Treatment to date:  [x] Therapeutic Exercise   [] Modalities:  [x] Therapeutic Activity    [] Ultrasound  [] Electrical Stimulation   [x] Activities of Daily Living    [] Fluidotherapy [] Kinesiotaping  [x] Neuromuscular Re-education   [] Iontophoresis [] Coldpack/hotpack   [x] Instruction in HEP     [] Contrast Bath  [] Manual Therapy     Other:  [] Aquatic Therapy      [] ? Frequency/Duration:  # Days per week: [x] 1 day # Weeks: [] 1 week [] 5 weeks      [x] 2 days?    [] 2 weeks [] 6 weeks     [] 3 days   [] 3 weeks [] 7 weeks     [] 4 days   [x] 4 weeks [] 8 weeks    Rehab Potential: [] excellent [x] good [] fair  [] poor     Goals  Short term goals  Time Frame for Short term goals: 4-6 weeks  Short term goal 1: Pt will be independent with HEP and report completing daily  Short term goal 2: Pt will increase R UE strength to 4+/5 for increased ability to complete ADLs  Short term goal 3: Pt will report completing all ADLs Ind for increased independence with mourning routine  Short term goal 4: Pt will increase R hand  strength by 5# for increased ability to complete home tasks  Short term goal 5: Pt will decrease R hand 9 hole peg test by 5 seconds for increased coordination with ADLs  Patient Goals   Patient goals : increase strength and independence      Electronically signed by:  Avni Quesada, OT,OTR/L      If you have any questions or concerns, please don't hesitate to call.   Thank you for your referral.      Physician Signature:________________________________Date:__________________  By signing above, therapists plan is approved by physician

## 2021-08-19 NOTE — PROGRESS NOTES
Occupational Therapy      Occupational Therapy Daily Treatment Note    Date:  2021    Patient Name:  Christianna Duverney     :  1950  MRN: [id number]  Restrictions/Precautions:    Medical/Treatment Diagnosis Information:  · Diagnosis: G93.89 (ICD-10-CM) - Brain mass  ·    Insurance/Certification information:  OT Insurance Information: MEDICARE  Physician Information:  Referring Practitioner: David Kate MD  Plan of care signed (Y/N):  N  Visit# / total visits:     Pain level: 0/10     G-Code (if applicable):      Date / Visit # G-Code Applied:  /    QuickDASH Total Score: 26 (21)       QuickDASH Disability/Symptom Score : 34.09 % (21)    Progress Note: [x]  Yes  []  No  Next due by: Visit #10      Subjective: Pt reports no pain. Pt reports R sided weakness. Objective Measures:  Eval   ADL  Feeding: Independent  Grooming: Setup  UE Bathing: Setup (seated)  LE Bathing: Minimal assistance; Moderate assistance  UE Dressing: Setup (seated)  LE Dressing: Minimal assistance; Moderate assistance  Toileting:  Moderate assistance (assist for balance)  Additional Comments: assist from family  Tone RUE  RUE Tone: Normotonic  Tone LUE  LUE Tone: Normotonic  Coordination  Movements Are Fluid And Coordinated: Yes  Balance  Sitting Balance: Stand by assistance  Standing Balance: Contact guard assistance (RW)  Bed mobility  Supine to Sit: Moderate assistance  Sit to Supine: Contact guard assistance  Transfers  Sit to stand: Minimal assistance  Stand to sit: Minimal assistance  Cognition  Overall Cognitive Status: WFL  Sensation  Overall Sensation Status:  (reports intermittent numbness in hands)  LUE AROM (degrees)  LUE AROM : WFL  Left Hand AROM (degrees)  Left Hand AROM: WFL  RUE AROM (degrees)  RUE AROM : WFL  Right Hand AROM (degrees)  Right Hand AROM: WFL  LUE Strength  Gross LUE Strength: WFL  L Shoulder Flex: 5/5  L Shoulder Ext: 5/5  L Shoulder ABduction: 5/5  L Shoulder ADduction: coordination. Pt presents with ~4/5 strength in R UE. Pt presents with mild deficits with coordination including decreased speed. Pt denises sensation deficits. Pt completes transfers with Min A and use of RW. Pt reports needing assist with all ADLs. Pt reports good assist from family at home although wishes to return to independent. Pt will benefit from skilled OT services at this time at a frequency of 1-2x a week for 4 weeks.     Prognosis: [x]  Good []  Fair  []  Poor    Goals  Short term goals  Time Frame for Short term goals: 4-6 weeks  Short term goal 1: Pt will be independent with HEP and report completing daily  Short term goal 2: Pt will increase R UE strength to 4+/5 for increased ability to complete ADLs  Short term goal 3: Pt will report completing all ADLs Ind for increased independence with mourning routine  Short term goal 4: Pt will increase R hand  strength by 5# for increased ability to complete home tasks  Short term goal 5: Pt will decrease R hand 9 hole peg test by 5 seconds for increased coordination with ADLs  Patient Goals   Patient goals : increase strength and independence    Patient Requires Follow-up:  [x]  Yes  []  No    Plan: []  Continue per plan of care []  Alter current plan (see comments)   [x]  Plan of care initiated []  Hold pending MD visit []  Discharge    Plan for Next Session:      Electronically signed by:  Yaima Woodward OT,OTR/L

## 2021-09-01 ENCOUNTER — HOSPITAL ENCOUNTER (OUTPATIENT)
Dept: PHYSICAL THERAPY | Age: 71
Setting detail: THERAPIES SERIES
Discharge: HOME | End: 2021-09-01
Payer: MEDICARE

## 2021-09-01 NOTE — PROGRESS NOTES
Outpatient Physical Therapy  [] [facility]    Phone: [phone]   Fax: [phone]   [x] [facility]  Phone: [phone]   Fax: [phone]  [] [facility]              Phone: [phone]   Fax: [phone]     Physical Therapy Discharge Note  Date: 2021        Patient Name:  Sheri Martinez    :  1950  MRN: [id number]  Restrictions/Precautions:  Fall Risk  Diagnosis:  G93.89 (ICD-10-CM) - Brain mass  Treatment Diagnosis:  Decreased functional mobility ; Decreased strength;Decreased ROM; Decreased endurance;Decreased balance;Decreased ADL status; Decreased safe awareness  Insurance/Certification information:  [employer]  Referring Physician:  Dr. Toya Medina of care signed (Y/N):    Visit# / total visits:    Pain level: N/A     Time Period for Report:  2021 - 2021  Cancels/No-shows to date:  2 No-Shows    Plan of Care/Treatment to date:  [x] Therapeutic Exercise    [x] Modalities:  [x] Therapeutic Activity     [] Ultrasound  [] Electrical Stimulation  [x] Gait Training      [] Cervical Traction    [] Lumbar Traction  [x] Neuromuscular Re-education  [] Cold/hotpack [] Iontophoresis  [x] Instruction in HEP      Other:  [x] Manual Therapy       []    [] Aquatic Therapy       []                          Significant Findings At Last Visit/Comments:    Subjective:  N/A       Objective:  Latest measurements taken on initial evaluation, 2021   Observation:    Bed mobility  Supine to Sit: Moderate assistance  Sit to Supine: Contact guard assistance      Transfers  Sit to Stand: Moderate Assistance  Stand to sit: Moderate Assistance  Bed to Chair: Moderate assistance    Ambulation  Ambulation?: Yes  Ambulation 1  Surface: level tile  Device: Rolling Walker  Assistance: Contact guard assistance  Quality of Gait: Slow speed, mild knee buckling, step-through pattern, limited d/t fatigue.   Distance: 22'     Test measurements:      AROM RLE (degrees)  RLE AROM: Exceptions  RLE General AROM: Hip Flex moderately limited, ABD mildly limited, Knee Flex/Ext WFL. Ankle PF/DF WFL. AROM LLE (degrees)  LLE AROM : WNL  LLE General AROM: Hip Flex, Knee Flex/Ext, and Ankle PF/DF WNL  AROM RUE (degrees)  RUE AROM : WFL  AROM LUE (degrees)  LUE AROM : WFL     Strength RLE  Strength RLE: Exception  R Hip Flexion: 2/5  R Hip ABduction: 2/5  R Knee Flexion: 3/5  R Knee Extension: 3+/5  R Ankle Dorsiflexion: 3+/5  R Ankle Plantar flexion: 2/5     Strength LLE  L Hip Flexion: 3+/5  L Hip ABduction: 2+/5  L Knee Flexion: 3+/5  L Knee Extension: 4/5  L Ankle Dorsiflexion: 3+/5  L Ankle Plantar Flexion: 3+/5     Strength RUE  Strength RUE: WFL  Strength LUE  Strength LUE: WFL     Assessment:   Summary: *8/18/2021 - Pt is a 73 y.o. F. with recent diagnosis of multiple glioblastomas, beginning OP PT d/t R-sided weakness, hx of falls. She presents pleasant and agreeable to evaluation, mildly confused, demonstrating significantly decreased RLE strength. She required Mod A for transfers, but did well to ambulate 25' with walker, CGA. She would benefit from 6-8 weeks of continued therapy to improve her strength, balance, ROM, endurance, and independence transferring/ambulating. Will continue to assess for DME needs. *9/1/2021 - Pt did not show for her next 2 visits. PT unable to reach pt via phone, and she did not call the office to give a reason for cancellation. Based on her inability to make her appointments, she is now discharged. Progression Towards Functional goals:  [] Patient is progressing as expected towards functional goals listed. [x] Progression is slowed due to complexities listed. [] Progression has been slowed due to co-morbidities. [] Plan just implemented, too soon to assess goals progression  [] Other:    Goals:     Short term goals  Time Frame for Short term goals:  In 3-4 weeks pt will  Short term goal 1: Improve R hip/knee/ankle strength to grossly 3/5 to facilitate improved standing/ambulation tolerance - not met 9/1/2021  Short term goal 2: Complete sit < > stand transfers consistently with CGA to decrease burden of care on family - not met 9/1/2021  Short term goal 3: Ambulate 100' with walker, CGA to facilitate improved ambulation tolerance - not met 9/1/2021    Long term goals  Time Frame for Long term goals : In 6-8 weeks pt will  Long term goal 1: Score at least 22 on the LEFS to facilitate improved independence with household tasks - not met 9/1/2021  Long term goal 2: Ascend/Descend 12 steps with CGA to allow pt to access her bedroom  Long term goal 3: Complete transfers with supervision to facilitate improved independence with mobility tasks - not met 9/1/2021  Long term goal 4: Ambulate 200' with walker, supervision, to facilitate improved independence with community mobility - not met 9/1/2021    Patient Goals   Patient goals : To walk by herself again - not met 9/1/2021      Rehab Potential: [] Excellent [] Good [] Fair  [x] Poor     Goal Status:  [] Achieved [] Partially Achieved  [x] Not Achieved     Current Frequency/Duration:  # Days per week: [x] 1 day # Weeks: [] 1 week [] 4 weeks      [] 2 days   [] 2 weeks [] 5 weeks      [] 3 days   [] 3 weeks [x] 6 weeks     Patient Status: [] Continue per initial plan of Care     [x] Patient now discharged     [] Additional visits requested, Please re-certify for additional visits:      Requested frequency/duration:  X/week for weeks    Electronically signed by:  Donnie Carmen PT   Electronically signed by Donnie Carmen PT [id number] on 9/1/2021 at 2:05 PM      If you have any questions or concerns, please don't hesitate to call.   Thank you for your referral.    Physician Signature:________________________________Date:__________________  By signing above, therapists plan is approved by physician

## 2021-09-01 NOTE — PROGRESS NOTES
Physical Therapy  Cancellation/No-show Note  Patient Name:  Seth Tristan  :  1950   Date:  2021  Cancelled visits to date: 0  No-shows to date: 1    For today's appointment patient:  []  Cancelled  []  Rescheduled appointment  [x]  No-show     Reason given by patient:  []  Patient ill  []  Conflicting appointment  []  No transportation    []  Conflict with work  [x]  No reason given  []  Other:     Comments:      Electronically signed by:  Meera Street PT  Electronically signed by Meera Street, [place] [id number] on 2021 at 2:00 PM

## 2021-09-03 ENCOUNTER — APPOINTMENT (OUTPATIENT)
Dept: GENERAL RADIOLOGY | Age: 71
End: 2021-09-03
Payer: MEDICARE

## 2021-09-03 ENCOUNTER — HOSPITAL ENCOUNTER (EMERGENCY)
Age: 71
Discharge: HOME | End: 2021-09-04
Attending: EMERGENCY MEDICINE
Payer: MEDICARE

## 2021-09-03 DIAGNOSIS — R79.89 ELEVATED LFTS: ICD-10-CM

## 2021-09-03 DIAGNOSIS — D84.821 IMMUNODEFICIENCY DUE TO CHEMOTHERAPY (HCC): ICD-10-CM

## 2021-09-03 DIAGNOSIS — C71.1 MALIGNANT NEOPLASM OF FRONTAL LOBE (HCC): Primary | ICD-10-CM

## 2021-09-03 DIAGNOSIS — T45.1X5A IMMUNODEFICIENCY DUE TO CHEMOTHERAPY (HCC): ICD-10-CM

## 2021-09-03 DIAGNOSIS — R77.8 ELEVATED TROPONIN: ICD-10-CM

## 2021-09-03 DIAGNOSIS — E86.0 DEHYDRATION: ICD-10-CM

## 2021-09-03 DIAGNOSIS — Z79.899 IMMUNODEFICIENCY DUE TO CHEMOTHERAPY (HCC): ICD-10-CM

## 2021-09-03 LAB
ALBUMIN SERPL-MCNC: 3.6 G/DL (ref 3.4–5)
ALP BLD-CCNC: 76 U/L (ref 40–129)
ALT SERPL-CCNC: 392 U/L (ref 10–40)
ANION GAP SERPL CALCULATED.3IONS-SCNC: 16 MMOL/L (ref 3–16)
AST SERPL-CCNC: 88 U/L (ref 15–37)
BASOPHILS ABSOLUTE: 0 K/UL (ref 0–0.2)
BASOPHILS RELATIVE PERCENT: 0.8 %
BILIRUB SERPL-MCNC: 0.5 MG/DL (ref 0–1)
BILIRUBIN DIRECT: <0.2 MG/DL (ref 0–0.3)
BILIRUBIN, INDIRECT: ABNORMAL MG/DL (ref 0–1)
BUN BLDV-MCNC: 45 MG/DL (ref 7–20)
CALCIUM SERPL-MCNC: 9.5 MG/DL (ref 8.3–10.6)
CHLORIDE BLD-SCNC: 108 MMOL/L (ref 99–110)
CO2: 16 MMOL/L (ref 21–32)
CREAT SERPL-MCNC: 0.9 MG/DL (ref 0.6–1.2)
EOSINOPHILS ABSOLUTE: 0 K/UL (ref 0–0.6)
EOSINOPHILS RELATIVE PERCENT: 0.4 %
GFR AFRICAN AMERICAN: >60
GFR NON-AFRICAN AMERICAN: >60
GLUCOSE BLD-MCNC: 155 MG/DL (ref 70–99)
HCT VFR BLD CALC: 29.9 % (ref 36–48)
HEMOGLOBIN: 10 G/DL (ref 12–16)
LIPASE: 87 U/L (ref 13–60)
LYMPHOCYTES ABSOLUTE: 1 K/UL (ref 1–5.1)
LYMPHOCYTES RELATIVE PERCENT: 21.4 %
MCH RBC QN AUTO: 28.2 PG (ref 26–34)
MCHC RBC AUTO-ENTMCNC: 33.5 G/DL (ref 31–36)
MCV RBC AUTO: 84.2 FL (ref 80–100)
MONOCYTES ABSOLUTE: 0.3 K/UL (ref 0–1.3)
MONOCYTES RELATIVE PERCENT: 5.9 %
NEUTROPHILS ABSOLUTE: 3.5 K/UL (ref 1.7–7.7)
NEUTROPHILS RELATIVE PERCENT: 71.5 %
PDW BLD-RTO: 19.7 % (ref 12.4–15.4)
PLATELET # BLD: 67 K/UL (ref 135–450)
PLATELET SLIDE REVIEW: ABNORMAL
PMV BLD AUTO: 6.9 FL (ref 5–10.5)
POTASSIUM REFLEX MAGNESIUM: 4.6 MMOL/L (ref 3.5–5.1)
RBC # BLD: 3.54 M/UL (ref 4–5.2)
SLIDE REVIEW: ABNORMAL
SODIUM BLD-SCNC: 140 MMOL/L (ref 136–145)
TOTAL PROTEIN: 5.9 G/DL (ref 6.4–8.2)
TROPONIN: 0.04 NG/ML
WBC # BLD: 4.9 K/UL (ref 4–11)

## 2021-09-03 PROCEDURE — 2580000003 HC RX 258: Performed by: NURSE PRACTITIONER

## 2021-09-03 PROCEDURE — 83880 ASSAY OF NATRIURETIC PEPTIDE: CPT

## 2021-09-03 PROCEDURE — 99282 EMERGENCY DEPT VISIT SF MDM: CPT

## 2021-09-03 PROCEDURE — 93005 ELECTROCARDIOGRAM TRACING: CPT | Performed by: NURSE PRACTITIONER

## 2021-09-03 PROCEDURE — 71045 X-RAY EXAM CHEST 1 VIEW: CPT

## 2021-09-03 PROCEDURE — 84484 ASSAY OF TROPONIN QUANT: CPT

## 2021-09-03 PROCEDURE — 85025 COMPLETE CBC W/AUTO DIFF WBC: CPT

## 2021-09-03 PROCEDURE — 96360 HYDRATION IV INFUSION INIT: CPT

## 2021-09-03 PROCEDURE — 83690 ASSAY OF LIPASE: CPT

## 2021-09-03 PROCEDURE — 36415 COLL VENOUS BLD VENIPUNCTURE: CPT

## 2021-09-03 PROCEDURE — 80076 HEPATIC FUNCTION PANEL: CPT

## 2021-09-03 PROCEDURE — 80048 BASIC METABOLIC PNL TOTAL CA: CPT

## 2021-09-03 RX ORDER — 0.9 % SODIUM CHLORIDE 0.9 %
1000 INTRAVENOUS SOLUTION INTRAVENOUS ONCE
Status: COMPLETED | OUTPATIENT
Start: 2021-09-03 | End: 2021-09-03

## 2021-09-03 RX ADMIN — SODIUM CHLORIDE 1000 ML: 9 INJECTION, SOLUTION INTRAVENOUS at 21:55

## 2021-09-04 VITALS
TEMPERATURE: 98.1 F | SYSTOLIC BLOOD PRESSURE: 98 MMHG | RESPIRATION RATE: 18 BRPM | DIASTOLIC BLOOD PRESSURE: 49 MMHG | HEART RATE: 88 BPM | OXYGEN SATURATION: 96 %

## 2021-09-04 LAB
EKG ATRIAL RATE: 85 BPM
EKG DIAGNOSIS: NORMAL
EKG P AXIS: 106 DEGREES
EKG P-R INTERVAL: 176 MS
EKG Q-T INTERVAL: 346 MS
EKG QRS DURATION: 64 MS
EKG QTC CALCULATION (BAZETT): 411 MS
EKG R AXIS: 15 DEGREES
EKG T AXIS: 3 DEGREES
EKG VENTRICULAR RATE: 85 BPM
PRO-BNP: 427 PG/ML (ref 0–124)
TROPONIN: 0.03 NG/ML

## 2021-09-04 PROCEDURE — 93010 ELECTROCARDIOGRAM REPORT: CPT | Performed by: INTERNAL MEDICINE

## 2021-09-04 ASSESSMENT — ENCOUNTER SYMPTOMS
DIARRHEA: 0
SHORTNESS OF BREATH: 0
CONSTIPATION: 0
ABDOMINAL PAIN: 0
COUGH: 0
BLOOD IN STOOL: 0
NAUSEA: 0
COLOR CHANGE: 0
VOMITING: 0
BACK PAIN: 0
ABDOMINAL DISTENTION: 0

## 2021-09-04 NOTE — ED PROVIDER NOTES
Attending Supervising Physicians Attestation Statement  I was present with the Mid Level Provider during the history and exam. I discussed the findings and plans with the Mid Level Provider and agree as documented in his note     73year-old dehydration. Feels better after fluids. Troponin x2 downtrending. Has no chest pain or shortness of breath. Discussed admission versus discharge. Patient would like to go home. She feels significantly better after fluids. States the only reason she came here was to get fluids. Significant labs and findings discussed with patient. Strict return precautions given. Vitals:    09/03/21 2109 09/04/21 0036   BP: (!) 85/55 (!) 98/49   Pulse: 105 88   Resp: 18    Temp: 98 °F (36.7 °C) 98.1 °F (36.7 °C)   TempSrc: Tympanic    SpO2: 96%      I estimate there is LOW risk for Sepsis, MI, Stroke, Tamponade, PTX, Toxicity or other life threatening etiology thus I consider the discharge disposition reasonable. The patient is at low risk for mortality based on demographic, history and clinical factors. Given the best available information and clinical assessment, I estimate the risk of hospitalization to be greater than risk of treatment at home. I have explained to the patient that the risk could rapidly change, given precautions for return and instructions. Explained to patient that the risk for mortality is low based on demographic, history and clinical factors. I discussed with patient the results of evaluation in the ED, diagnosis, care, and prognosis. The plan is to discharge to home. Patient is in agreement with plan and questions have been answered. I also discussed with patient the reasons which may require a return visit and the importance of follow-up care. The patient is well-appearing, nontoxic, and improved at the time of discharge. Patient agrees to call to arrange follow-up care as directed.    Patient understands to return immediately for worsening/change in symptoms.       Electronically signed by Angie Gutierrez MD on 9/4/21 at 1:12 AM EDT            Angie Gutierrez MD  09/04/21 8806

## 2021-09-04 NOTE — ED PROVIDER NOTES
Use Topics    Alcohol use: No    Drug use: No       SCREENINGS             PHYSICAL EXAM    (up to 7 for level 4, 8 or more for level 5)     ED Triage Vitals [09/03/21 2109]   BP Temp Temp Source Pulse Resp SpO2 Height Weight   (!) 85/55 98 °F (36.7 °C) Tympanic 105 18 96 % -- --       Physical Exam  Vitals and nursing note reviewed. Constitutional:       General: She is not in acute distress. Appearance: Normal appearance. She is well-developed. She is not toxic-appearing. HENT:      Head: Normocephalic and atraumatic. Mouth/Throat:      Mouth: Mucous membranes are dry. Eyes:      General: No scleral icterus. Conjunctiva/sclera: Conjunctivae normal.   Neck:      Vascular: No JVD. Cardiovascular:      Rate and Rhythm: Normal rate and regular rhythm. Heart sounds: Normal heart sounds. Pulmonary:      Effort: Pulmonary effort is normal. No respiratory distress. Breath sounds: Normal breath sounds. Abdominal:      General: There is no distension. Palpations: Abdomen is soft. Abdomen is not rigid. Tenderness: There is no abdominal tenderness. There is no right CVA tenderness, left CVA tenderness, guarding or rebound. Musculoskeletal:         General: Normal range of motion. Cervical back: Full passive range of motion without pain and neck supple. No rigidity. Skin:     General: Skin is warm and dry. Capillary Refill: Capillary refill takes less than 2 seconds. Findings: No rash. Neurological:      General: No focal deficit present. Mental Status: She is alert and oriented to person, place, and time. Cranial Nerves: Cranial nerves are intact. Sensory: Sensation is intact. Motor: Motor function is intact.    Psychiatric:         Mood and Affect: Mood normal.         DIAGNOSTIC RESULTS   LABS:    Labs Reviewed   CBC WITH AUTO DIFFERENTIAL - Abnormal; Notable for the following components:       Result Value    RBC 3.54 (*)     Hemoglobin 10.0 (*)     Hematocrit 29.9 (*)     RDW 19.7 (*)     Platelets 67 (*)     All other components within normal limits    Narrative:     Performed at:  [address] [facility]   Phone [phone]   BASIC METABOLIC PANEL W/ REFLEX TO MG FOR LOW K - Abnormal; Notable for the following components:    CO2 16 (*)     Glucose 155 (*)     BUN 45 (*)     All other components within normal limits    Narrative:     Performed at:  [address] [facility]   Phone [phone]   HEPATIC FUNCTION PANEL - Abnormal; Notable for the following components: Total Protein 5.9 (*)      (*)     AST 88 (*)     All other components within normal limits    Narrative:     Performed at:  [address] [facility]   Phone [phone]   LIPASE - Abnormal; Notable for the following components:    Lipase 87.0 (*)     All other components within normal limits    Narrative:     Performed at:  [address] [facility]   Phone [phone]   TROPONIN - Abnormal; Notable for the following components:    Troponin 0.04 (*)     All other components within normal limits    Narrative:     Performed at:  [address] [facility]   Phone [phone]   TROPONIN - Abnormal; Notable for the following components:    Troponin 0.03 (*)     All other components within normal limits    Narrative:     Performed at:  [address] [facility]   Phone [phone]   [facility] [place]       When ordered only abnormal lab results are displayed. All other labs were within normal range or not returned as of this dictation. EKG:  When ordered, EKG's are interpreted by the Emergency Department Physician in the absence of a cardiologist.  Please see their note for interpretation of EKG. RADIOLOGY:   Non-plain film images such as CT, Ultrasound and MRI are read by the radiologist. Plain radiographic images are visualized and preliminarily interpreted by the ED Provider with the below findings:        Interpretation per the Radiologist below, if available at the time of this note:    XR CHEST PORTABLE   Final Result   No acute disease. XR CHEST PORTABLE    Result Date: 9/3/2021  EXAMINATION: ONE XRAY VIEW OF THE CHEST 9/3/2021 10:35 pm COMPARISON: 07/04/2021 HISTORY: ORDERING SYSTEM PROVIDED HISTORY: SOB TECHNOLOGIST PROVIDED HISTORY: Reason for exam:->SOB Reason for Exam: SOB Acuity: Acute Type of Exam: Initial FINDINGS: The patient is rotated. Lung volumes are low. The lungs are clear. The heart size is at the upper limits of normal.  Left subclavian central venous catheter is again seen. The costophrenic angles are sharp. There is no discernible pneumothorax. No acute disease. PROCEDURES   Unless otherwise noted below, none     Procedures    CRITICAL CARE TIME   N/A    CONSULTS:  None      EMERGENCY DEPARTMENT COURSE and DIFFERENTIAL DIAGNOSIS/MDM:   Vitals:    Vitals:    09/03/21 2109 09/04/21 0036   BP: (!) 85/55 (!) 98/49   Pulse: 105 88   Resp: 18    Temp: 98 °F (36.7 °C) 98.1 °F (36.7 °C)   TempSrc: Tympanic    SpO2: 96%        Patient was given the following medications:  Medications   0.9 % sodium chloride bolus (0 mLs IntraVENous Stopped 9/3/21 4857)           Patient seen and examined today for dehydration. See HPI for patient presentation. Patient is hemodynamically stable, nontoxic, afebrile, and without tachycardia, tachypnea, and hypoxia. Physical exam as above. 72-year-old female lying in bed in no acute distress. Awake alert and oriented. She has no neurovascular deficits. Lungs are CTA bilaterally. Cardiac exam is normal.  Abdomen soft with no tenderness. Hemoglobin 10 with normal white count. Renal function normal.  Electrolytes normal.  LFTs elevated. Lipase 80. CXR normal.  Initial troponin 0.04>0. 03. Patient given 1 L of fluids. She advised she felt much better and wanted to go home. She had no chest pain. I have no suspicion for ACS. Blood pressure are soft which is likely chronic for patient. She feels well and again wants to go home. Discharged home in stable condition with strict return precautions. At this time, the evidence for any other entities in the differential is insufficient to justify any further testing. This was explained to the patient. The patient was advised that persistent or worsening symptoms will require further evaluation. I discussed with Carleen Pallas and/or family the exam results, diagnosis, care, prognosis, reasons to return and the importance of follow up. Patient and/or family is in full agreement with plan and all questions have been answered. Specific discharge instructions explained, including reasons to return to the emergency department. Carleen Pallas is well appearing, non-toxic, and afebrile at the time of discharge. Patient was instructed to follow up with primary care provider in 24-48 hours, and to instructed to return to ED immediately for any new or worsening concerns. Carleen Pallas verbalized understanding and discharged home. The patient tolerated their visit well. They were seen and evaluated by the attending physician, Eva Guidry MD who agreed with the assessment and plan. The patient and / or the family were informed of the results of any tests, a time was given to answer questions, a plan was proposed and they agreed with plan. FINAL IMPRESSION      1. Malignant neoplasm of frontal lobe ([facility] [address]    2. 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 Dehydration    4. Elevated LFTs    5.  Elevated troponin DISPOSITION/PLAN   DISPOSITION Decision To Discharge 09/04/2021 12:36:43 AM      PATIENT REFERRED TO:  your pcp    Schedule an appointment as soon as possible for a visit       [facility] Emergency Department  [address] [address]  [phone]  Go to   As needed      DISCHARGE MEDICATIONS:  New Prescriptions    No medications on file       DISCONTINUED MEDICATIONS:  Discontinued Medications    No medications on file              (Please note that portions of this note were completed with a voice recognition program.  Efforts were made to edit the dictations but occasionally words are mis-transcribed.)    BLAYNE Yee CNP (electronically signed)            BLAYNE Yee CNP  09/04/21 0992

## 2021-09-04 NOTE — ED NOTES
D/C: Order noted for d/c. Pt confirmed d/c paperwork have correct name. Discharge and education instructions reviewed with patient. Teach-back successful. Pt verbalized understanding and signed d/c papers. Pt denied questions at this time. No acute distress noted. Patient instructed to follow-up as noted - return to emergency department if symptoms worsen. Patient verbalized understanding. Discharged per EDMD with discharge instructions. Pt discharged to private vehicle. Patient stable upon departure. Thanked patient for choosing [facility]) for care. Provider aware of patient pain at time of discharge.      Marylen Mince, RN  09/04/21 1892

## 2021-09-15 NOTE — PROGRESS NOTES
Occupational Therapy        Outpatient Occupational Therapy  [] [facility] [address] ALEMAN   Phone: [phone]   Fax: [phone]   [x] [facility]           Phone: [phone]   Fax: [phone]  [] [facility]   Phone: [phone]   Fax: [phone]     Occupational Therapy Discharge Note  Date: 9/15/2021        Patient Name:  Christianna Duverney    :  1950  MRN: [id number]    Restrictions/Precautions:    Medical/Treatment Diagnosis Information:  · Diagnosis: G93.89 (ICD-10-CM) - Brain mass  ·   Insurance/Certification information:  OT Insurance Information: MEDICARE  Physician Information:  Referring Practitioner: David Kate MD  Plan of care signed (Y/N):  N  Visit# / total visits:     Pain level:      0/10          G-Code (if applicable):                                             Date / Visit # G-Code Applied:  /    QuickDASH Total Score: 26 (21)       QuickDASH Disability/Symptom Score : 34.09 % (21)    Time Period for Report:  21-21  Cancels/No-shows to date: 2 no shows     Plan of Care/Treatment to date: D/C from OT  [] Therapeutic Exercise    [] Modalities:  [] Therapeutic Activity     [] Ultrasound  [] Electrical Stimulation  [] Total Motion Release     [] Fluidotherapy [] Vivien Feeling  [] Neuromuscular Re-education  [] Cold/hotpack [] Iontophoresis  [] Instruction in HEP     Other:  [] Manual Therapy      []            [] Aquatic Therapy      []                   ? Significant Findings At Last Visit/Comments:    Subjective:     Pt has no shown for last 2 sessions. No calls returned or placed to therapy at this time. D/C from OT. Objective:  Eval   ADL  Feeding: Independent  Grooming: Setup  UE Bathing: Setup (seated)  LE Bathing: Minimal assistance; Moderate assistance  UE Dressing: Setup (seated)  LE Dressing: Minimal assistance; Moderate assistance  Toileting:  Moderate assistance (assist for balance)  Additional Comments: assist from family  [facility] Tone: Normotonic  Tone LUE  LUE Tone: Normotonic  Coordination  Movements Are Fluid And Coordinated: Yes  Balance  Sitting Balance: Stand by assistance  Standing Balance: Contact guard assistance (RW)  Bed mobility  Supine to Sit: Moderate assistance  Sit to Supine: Contact guard assistance  Transfers  Sit to stand: Minimal assistance  Stand to sit: Minimal assistance  Cognition  Overall Cognitive Status: WFL  Sensation  Overall Sensation Status:  (reports intermittent numbness in hands)  LUE AROM (degrees)  LUE AROM : WFL  Left Hand AROM (degrees)  Left Hand AROM: WFL  RUE AROM (degrees)  RUE AROM : WFL  Right Hand AROM (degrees)  Right Hand AROM: WFL  LUE Strength  Gross LUE Strength: WFL  L Shoulder Flex: 5/5  L Shoulder Ext: 5/5  L Shoulder ABduction: 5/5  L Shoulder ADduction: 5/5  L Elbow Flex: 5/5  L Elbow Ext: 4+/5  L Hand General: 5/5  RUE Strength  Gross RUE Strength: Exceptions to [facility]  R Shoulder Flex: 4/5  R Shoulder Ext: 4/5  R Shoulder ABduction: 4/5  R Shoulder ADduction: 4/5  R Elbow Flex: 4/5  R Elbow Ext: 4/5  R Hand General: 4/5  Hand Dominance  Hand Dominance: Right  Left Hand Strength -  (lbs)  Handle Setting 2: 30, 31, 32  Left 9-Hole Peg Test  Left 9-Hole Peg Test: Functional  Right Hand Strength -  (lbs)  Handle Setting 2: 20, 20, 19  Right 9-Hole Peg Test  Right 9-Hole Peg Test: Impaired  Fine Motor Skills  Left 9-Hole Peg Test: Functional  Right 9-Hole Peg Test: Impaired  Right 9 Hole Peg Test Time (secs): 34.78     Assessment:  Patient is a 21-year-old female who presented for evaluation of right-sided weakness.  She was found to have brain mass.  Underwent brain MRI and CT chest/A/P.  Work-up was concerning for primary CNS malignancy.  Underwent brain mass biopsy 7/8/2021.  Pathology sample was sent to [facility].  Oncology and radiation oncology were consulted.  Patient treated with steroids, Keppra with improvement in right-sided weakness.  Pt presents to OP OT with decreased R UE strength and coordination. Pt presents with ~4/5 strength in R UE. Pt presents with mild deficits with coordination including decreased speed. Pt denises sensation deficits. Pt completes transfers with Min A and use of RW. Pt reports needing assist with all ADLs. Pt reports good assist from family at home although wishes to return to independent. Pt has no shown last two OT treatment sessions. Pt has not returned therapists phone calls or reached out to OT/PT. Pt now discharged due to inability to follow up with treatment sessions.        Progress towards goals:  No goals met   Goals  Short term goals  Time Frame for Short term goals: 4-6 weeks  Short term goal 1: Pt will be independent with HEP and report completing daily  Short term goal 2: Pt will increase R UE strength to 4+/5 for increased ability to complete ADLs  Short term goal 3: Pt will report completing all ADLs Ind for increased independence with mourning routine  Short term goal 4: Pt will increase R hand  strength by 5# for increased ability to complete home tasks  Short term goal 5: Pt will decrease R hand 9 hole peg test by 5 seconds for increased coordination with ADLs  Patient Goals   Patient goals : increase strength and independence    Current Frequency/Duration: D/C from OT  # Days per week: [] 1 day # Weeks: [] 1 week [] 4 weeks      [] 2 days? [] 2 weeks [] 5 weeks      [] 3 days   [] 3 weeks [] 6 weeks     Rehab Potential: [] Excellent [] Good [] Fair  [] Poor     Goal Status:  [] Achieved [] Partially Achieved  [x] Not Achieved     Patient Status: [] Continue per initial plan of Care     [x] Patient now discharged     [] Additional visits requested, Please re-certify for additional visits:      Requested frequency/duration:  X/week for weeks    Electronically signed by:  Bonifacio Media, OT, OTR/L    If you have any questions or concerns, please don't hesitate to call.   Thank you for your referral.    Physician Signature:________________________________Date:__________________  By signing above, therapists plan is approved by physician

## 2023-03-10 NOTE — TELEPHONE ENCOUNTER
See media order was faxed on 5/23/19 - has patient been set up on cpap? We mailed letter on 4/25/19 to patient for 7/10/19 appt to call and reschedule. no

## (undated) DEVICE — PROTECTOR ULN NRV PUR FOAM HK LOOP STRP ANATOMICALLY

## (undated) DEVICE — ADHESIVE SKIN CLSR 0.7ML TOP DERMBND ADV

## (undated) DEVICE — UNDERGLOVE SURG SZ 8 BLU LTX FREE SYN POLYISOPRENE POLYMER

## (undated) DEVICE — 1 ML INSULIN SYRINGE LUER-LOCK WITH TIP CAP: Brand: MONOJECT

## (undated) DEVICE — SYRINGE WITH HYPODERMIC SAFETY NEEDLE: Brand: MAGELLAN

## (undated) DEVICE — SURE SET-DOUBLE BASIN-LF: Brand: MEDLINE INDUSTRIES, INC.

## (undated) DEVICE — APPLICATOR MEDICATED 26 CC SOLUTION HI LT ORNG CHLORAPREP

## (undated) DEVICE — PIN ADLT MAYFIELD RIGID MOLD FINGER

## (undated) DEVICE — GLOVE SURG SZ 75 L12IN FNGR THK94MIL TRNSLUC YEL LTX

## (undated) DEVICE — MARKER REFLECTIVE REFLECTIVE TWST ON SPHERZ 5PK

## (undated) DEVICE — ANES EXTENSION SET 90IN-LF: Brand: MEDLINE INDUSTRIES, INC.

## (undated) DEVICE — JEWISH HOSPITAL TURNOVER KIT: Brand: MEDLINE INDUSTRIES, INC.

## (undated) DEVICE — SUTURE MCRYL SZ 4-0 L27IN ABSRB UD L19MM PS-2 1/2 CIR PRIM Y426H

## (undated) DEVICE — SURGICAL SET UP - SURE SET: Brand: MEDLINE INDUSTRIES, INC.

## (undated) DEVICE — STAPLER SKIN H3.9MM WIRE DIA0.58MM CRWN 6.9MM 35 STPL ROT

## (undated) DEVICE — SYRINGE MED 10ML TRNSLUC BRL PLUNG BLK MRK POLYPR CTRL

## (undated) DEVICE — BLADE CLIPPER SURG SENSICLIP

## (undated) DEVICE — TOOL 9BA60 LEGEND 9CM 6MM BA: Brand: MIDAS REX

## (undated) DEVICE — NEEDLE BX OD1.8MM L150MM CUT WIND L10MM MTL RUL SYR SYR

## (undated) DEVICE — TOWEL,OR,DSP,ST,WHITE,DLX,4/PK,20PK/CS: Brand: MEDLINE

## (undated) DEVICE — CABLE BPLR L12FT FLYING LD DISPOSABLE

## (undated) DEVICE — SUTURE ETHLN SZ 3-0 L18IN NONABSORBABLE BLK PS-2 L19MM 3/8 1669H

## (undated) DEVICE — INTENDED USE FOR SURGICAL MARKING ON INTACT SKIN, ALSO PROVIDES A PERMANENT METHOD OF IDENTIFYING OBJECTS IN THE OPERATING ROOM: Brand: WRITESITE® PLUS MINI PREP RESISTANT MARKER

## (undated) DEVICE — SPONGE GZ W4XL4IN COT 12 PLY TYP VII WVN C FLD DSGN

## (undated) DEVICE — BLANKET WRM W40.2XL55.9IN IORT LO BODY + MISTRAL AIR

## (undated) DEVICE — COVER LT HNDL CAM BLU DISP W/ SURG CTRL

## (undated) DEVICE — PLATE ES AD W 9FT CRD 2

## (undated) DEVICE — JEWISH CRANI PACK: Brand: MEDLINE INDUSTRIES, INC.

## (undated) DEVICE — SOLUTION IV 250ML 0.9% SOD CHL PH 5 INJ USP VIAFLX PLAS

## (undated) DEVICE — SSC BONE WAX: Brand: SSC BONE WAX

## (undated) DEVICE — GARMENT,MEDLINE,DVT,INT,CALF,MED, GEN2: Brand: MEDLINE

## (undated) DEVICE — NEURO SPONGES: Brand: DEROYAL